# Patient Record
Sex: FEMALE | Race: BLACK OR AFRICAN AMERICAN | HISPANIC OR LATINO | Employment: FULL TIME | URBAN - METROPOLITAN AREA
[De-identification: names, ages, dates, MRNs, and addresses within clinical notes are randomized per-mention and may not be internally consistent; named-entity substitution may affect disease eponyms.]

---

## 2017-04-21 ENCOUNTER — OFFICE VISIT (OUTPATIENT)
Dept: OBSTETRICS AND GYNECOLOGY | Facility: CLINIC | Age: 27
End: 2017-04-21
Payer: COMMERCIAL

## 2017-04-21 VITALS
WEIGHT: 211 LBS | SYSTOLIC BLOOD PRESSURE: 125 MMHG | HEIGHT: 67 IN | BODY MASS INDEX: 33.12 KG/M2 | DIASTOLIC BLOOD PRESSURE: 62 MMHG

## 2017-04-21 DIAGNOSIS — Z01.419 ROUTINE GYNECOLOGICAL EXAMINATION: Primary | ICD-10-CM

## 2017-04-21 PROCEDURE — 99385 PREV VISIT NEW AGE 18-39: CPT | Mod: S$GLB,,, | Performed by: OBSTETRICS & GYNECOLOGY

## 2017-04-21 PROCEDURE — 99999 PR PBB SHADOW E&M-NEW PATIENT-LVL III: CPT | Mod: PBBFAC,,, | Performed by: OBSTETRICS & GYNECOLOGY

## 2017-04-21 PROCEDURE — 88175 CYTOPATH C/V AUTO FLUID REDO: CPT
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
## 2017-08-06 ENCOUNTER — HOSPITAL ENCOUNTER (EMERGENCY)
Facility: HOSPITAL | Age: 27
Discharge: HOME | End: 2017-08-06
Attending: EMERGENCY MEDICINE
Payer: COMMERCIAL

## 2017-08-06 VITALS
TEMPERATURE: 98 F | HEIGHT: 67 IN | OXYGEN SATURATION: 98 % | HEART RATE: 64 BPM | SYSTOLIC BLOOD PRESSURE: 116 MMHG | WEIGHT: 185 LBS | RESPIRATION RATE: 18 BRPM | BODY MASS INDEX: 29.03 KG/M2 | DIASTOLIC BLOOD PRESSURE: 66 MMHG

## 2017-08-06 DIAGNOSIS — M25.531 RIGHT WRIST PAIN: ICD-10-CM
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
## 2017-08-06 LAB
B-HCG UR QL: NEGATIVE
CTP QC/QA: YES

## 2017-08-06 PROCEDURE — 81025 URINE PREGNANCY TEST: CPT | Performed by: PHYSICIAN ASSISTANT

## 2017-08-06 PROCEDURE — 25000003 PHARM REV CODE 250: Performed by: PHYSICIAN ASSISTANT

## 2017-08-06 PROCEDURE — 63600175 PHARM REV CODE 636 W HCPCS: Performed by: PHYSICIAN ASSISTANT

## 2017-08-06 PROCEDURE — 96372 THER/PROPH/DIAG INJ SC/IM: CPT

## 2017-08-06 PROCEDURE — 99284 EMERGENCY DEPT VISIT MOD MDM: CPT | Mod: 25

## 2017-08-06 RX ORDER — SILVER SULFADIAZINE 10 G/1000G
CREAM TOPICAL
Status: COMPLETED | OUTPATIENT
Start: 2017-08-06 | End: 2017-08-06

## 2017-08-06 RX ORDER — IBUPROFEN 600 MG/1
600 TABLET ORAL EVERY 6 HOURS PRN
Qty: 30 TABLET | Refills: 0 | Status: SHIPPED | OUTPATIENT
Start: 2017-08-06 | End: 2021-05-29

## 2017-08-06 RX ORDER — KETOROLAC TROMETHAMINE 30 MG/ML
10 INJECTION, SOLUTION INTRAMUSCULAR; INTRAVENOUS
Status: COMPLETED | OUTPATIENT
Start: 2017-08-06 | End: 2017-08-06

## 2017-08-06 RX ORDER — METHOCARBAMOL 500 MG/1
1000 TABLET, FILM COATED ORAL 3 TIMES DAILY PRN
Qty: 30 TABLET | Refills: 0 | Status: SHIPPED | OUTPATIENT
Start: 2017-08-06 | End: 2017-08-11

## 2017-08-06 RX ADMIN — KETOROLAC TROMETHAMINE 10 MG: 30 INJECTION, SOLUTION INTRAMUSCULAR at 10:08

## 2017-08-06 RX ADMIN — SILVER SULFADIAZINE: 10 CREAM TOPICAL at 10:08
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
## 2018-04-29 ENCOUNTER — OFFICE VISIT (OUTPATIENT)
Dept: URGENT CARE | Facility: CLINIC | Age: 28
End: 2018-04-29
Payer: COMMERCIAL

## 2018-04-29 VITALS
OXYGEN SATURATION: 98 % | HEART RATE: 110 BPM | WEIGHT: 222 LBS | TEMPERATURE: 99 F | SYSTOLIC BLOOD PRESSURE: 114 MMHG | BODY MASS INDEX: 34.84 KG/M2 | HEIGHT: 67 IN | RESPIRATION RATE: 16 BRPM | DIASTOLIC BLOOD PRESSURE: 78 MMHG

## 2018-04-29 DIAGNOSIS — Z87.898 HISTORY OF EPISTAXIS: ICD-10-CM

## 2018-04-29 DIAGNOSIS — R11.2 NAUSEA AND VOMITING, INTRACTABILITY OF VOMITING NOT SPECIFIED, UNSPECIFIED VOMITING TYPE: Primary | ICD-10-CM

## 2018-04-29 LAB
GLUCOSE SERPL-MCNC: 87 MG/DL (ref 70–110)
POC ANION GAP: 18 MMOL/L
POC BUN: 11 MMOL/L
POC CHLORIDE: 101 MMOL/L
POC CREATININE: 0.8 MG/DL (ref 0.6–1.3)
POC HEMATOCRIT: 44 %PCV (ref 37–47)
POC HEMOGLOBIN: 15 G/DL (ref 12.5–16)
POC ICA: 1.24 MMOL/L
POC POTASSIUM: 4 MMOL/L
POC SODIUM: 142 MMOL/L
POC TCO2: 29 MMOL/L

## 2018-04-29 PROCEDURE — 80047 BASIC METABLC PNL IONIZED CA: CPT | Mod: QW,S$GLB,, | Performed by: NURSE PRACTITIONER

## 2018-04-29 PROCEDURE — S0119 ONDANSETRON 4 MG: HCPCS | Mod: S$GLB,,, | Performed by: FAMILY MEDICINE

## 2018-04-29 PROCEDURE — 99214 OFFICE O/P EST MOD 30 MIN: CPT | Mod: S$GLB,,, | Performed by: NURSE PRACTITIONER

## 2018-04-29 RX ORDER — ONDANSETRON 8 MG/1
8 TABLET, ORALLY DISINTEGRATING ORAL
Status: COMPLETED | OUTPATIENT
Start: 2018-04-29 | End: 2018-04-29

## 2018-04-29 RX ORDER — ONDANSETRON 8 MG/1
8 TABLET, ORALLY DISINTEGRATING ORAL EVERY 8 HOURS PRN
Qty: 15 TABLET | Refills: 0 | Status: SHIPPED | OUTPATIENT
Start: 2018-04-29 | End: 2018-05-04

## 2018-04-29 RX ADMIN — ONDANSETRON 8 MG: 8 TABLET, ORALLY DISINTEGRATING ORAL at 03:04

## 2018-04-29 NOTE — PROGRESS NOTES
"Subjective:       Patient ID: Lilliana Sterling is a 27 y.o. female.    Vitals:  height is 5' 7" (1.702 m) and weight is 100.7 kg (222 lb). Her temperature is 99.2 °F (37.3 °C). Her blood pressure is 114/78 and her pulse is 110. Her respiration is 16 and oxygen saturation is 98%.     Chief Complaint: Emesis    Pt in because she had been throwing up blood. Started last night. having a sore throat as well. Pt said when she starts coughing she throws up. Been having a fever. Been having a bloody nose as well. Tried taking mukund seltzer last ngiht, but has not really helped.       Emesis    This is a new problem. The current episode started yesterday. The problem has been gradually worsening. Associated symptoms include coughing, dizziness, a fever, headaches and sweats. Pertinent negatives include no abdominal pain, chest pain, chills or diarrhea.     Review of Systems   Constitution: Positive for fever. Negative for chills.   Eyes: Negative for discharge.   Cardiovascular: Negative for chest pain.   Respiratory: Positive for cough. Negative for shortness of breath.    Skin: Negative for flushing and rash.   Musculoskeletal: Negative for back pain.   Gastrointestinal: Positive for vomiting. Negative for abdominal pain, constipation, diarrhea, hematochezia, melena and nausea.   Genitourinary: Negative for dysuria, genital sores, hematuria and urgency.   Neurological: Positive for dizziness, headaches and light-headedness.       Objective:      Physical Exam   Constitutional: She is oriented to person, place, and time. She appears well-developed and well-nourished. She appears ill.   HENT:   Head: Normocephalic and atraumatic.   Right Ear: External ear normal.   Left Ear: External ear normal.   Nose: Nose normal.   Mouth/Throat: Mucous membranes are normal. Posterior oropharyngeal erythema (Post nasal drainage noted) present. Tonsils are 1+ on the right. Tonsils are 1+ on the left. No tonsillar exudate.   Eyes: " Conjunctivae, EOM and lids are normal. Pupils are equal, round, and reactive to light.   Neck: Trachea normal, normal range of motion and full passive range of motion without pain. Neck supple.   Cardiovascular: Normal rate, regular rhythm and normal heart sounds.    Pulmonary/Chest: Effort normal and breath sounds normal. No respiratory distress.   Abdominal: Soft. Normal appearance and bowel sounds are normal. She exhibits no distension, no abdominal bruit, no pulsatile midline mass and no mass. There is no hepatosplenomegaly. There is no tenderness.   Musculoskeletal: Normal range of motion. She exhibits no edema.   Lymphadenopathy:     She has no cervical adenopathy.   Neurological: She is alert and oriented to person, place, and time. She has normal strength.   Skin: Skin is warm, dry and intact. She is not diaphoretic. No pallor.   Psychiatric: She has a normal mood and affect. Her speech is normal and behavior is normal. Judgment and thought content normal. Cognition and memory are normal.   Nursing note and vitals reviewed.      Assessment:       1. Nausea and vomiting, intractability of vomiting not specified, unspecified vomiting type    2. History of epistaxis        Plan:         Nausea and vomiting, intractability of vomiting not specified, unspecified vomiting type  -     POCT Chemistry Panel (Manual)  -     ondansetron disintegrating tablet 8 mg; Take 1 tablet (8 mg total) by mouth one time.  -     ondansetron (ZOFRAN-ODT) 8 MG TbDL; Take 1 tablet (8 mg total) by mouth every 8 (eight) hours as needed.  Dispense: 15 tablet; Refill: 0  -     Ambulatory referral to Internal Medicine    History of epistaxis      Patient Instructions   Nausea & Vomiting  If your condition worsens or fails to improve we recommend that you receive another evaluation at the ER immediately or contact your PCP to discuss your concerns or return here. You must understand that you've received an urgent care treatment only and  that you may be released before all your medical problems are known or treated. You the patient will arrange for followup care as instructed.   Use prescribed anti-nausea medicine as needed and prescribed   Increase fluids and rest is important   Start off with liquid diet and progress as tolerated (see below)  · Water and clear liquids are important so you do not get dehydrated. Drink a small amount at a time.  · Do not force yourself to eat, especially if you have cramps, vomiting, or diarrhea. When you finally decide to start eating, do not eat large amounts at a time, even if you are hungry.  · If you eat, avoid fatty, greasy, spicy, or fried foods.  · Do not eat dairy products if you have diarrhea; they can make the diarrhea worse  Watch for any increase pain, fever, localized pain to right lower abdomen or continued vomiting or diarrhea.    Nose Bleeds:  - OTC Saline Nasal Spray Daily as directed; Keep nose lubricated with Vaseline or petroleum jelly as needed  -  Avoid any environmental triggers   -  Humidifier use at home  -  Afrin Nasal Spray twice daily as directed for the next 3 days only.  -  If a nose bleed reoccurs,  Apply pressure to nasal bridge for 10 minutes without interruption.    -  For any worsening of symptoms or a nose bleed that increases beyond 20 minutes of applied pressure, please return here or go to the ER  -  Review patient handout for additional information  Diet for Vomiting or Diarrhea (Adult)    Your symptoms may return or get worse after eating certain foods listed below. If this happens, stop eating these foods until your symptoms ease and you feel better.  Once the vomiting stops, follow the steps below.   During the first 12 to 24 hours  During the first 12 to 24 hours, follow this diet:  · Drinks. Plain water, sport drinks like electrolyte solutions, soft drinks without caffeine, mineral water (plain or flavored), clear fruit juices, and decaffeinated tea and  "coffee.  · Soups. Clear broth.  · Desserts. Plain gelatin, popsicles, and fruit juice bars. As you feel better, you may add 6 to 8 ounces of yogurt per day. If you have diarrhea, don't have foods or drinks that contain sugar, high-fructose corn syrup, or sugar alcohols.  During the next 24 hours  During the next 24 hours you may add the following to the above:  · Hot cereal, plain toast, bread, rolls, and crackers  · Plain noodles, rice, mashed potatoes, and chicken noodle or rice soup  · Unsweetened canned fruit (but not pineapple) and bananas  Don't eat more than 15 grams of fat a day. Do this by staying away from margarine, butter, oils, mayonnaise, sauces, gravies, fried foods, peanut butter, meat, poultry, and fish.  Don't eat much fiber. Stay away from raw or cooked vegetables, fresh fruits (except bananas), and bran cereals.  Limit how much caffeine and chocolate you have. Do not use any spices or seasonings except salt.  During the next 24 hours  Slowly go back to your normal diet, as you feel better and your symptoms ease.  Date Last Reviewed: 8/1/2016  © 4368-3388 [employer]. [address] [place] [address]. All rights reserved. This information is not intended as a substitute for professional medical care. Always follow your healthcare professional's instructions.        Vomiting (Adult)  Vomiting is a common symptom that may be due to different causes. These include gastroenteritis ("stomach flu"), food poisoning and gastritis. There are other more serious causes of vomiting which may be hard to diagnose early in the illness. Therefore, it is important to watch for the warning signs listed below.  The main danger from repeated vomiting is dehydration. This is due to excess loss of water and minerals from the body. When this occurs, body fluids must be replaced.  Home care  · If symptoms are severe, rest at home for the next 24 hours.  · Because your symptoms may be from an " infection, wash your hands frequently and well, and use alcohol-based  to avoid spreading the infection to others.  · Wash your hands for at least 20 seconds. Hum the happy birthday song twice for the correct length of time.  · Wash your hands after using the toilet, before and after preparing food, before eating food, after changing a diaper, cleaning a wound, caring for a sick person, and blowing your nose, coughing, or sneezing. You should also wash your hands after caring for someone who is sick, touching pet food, or treats, and touching an animal, or animal waste.  · You may use acetaminophen or NSAID medicines like ibuprofen or naproxen to control fever, unless another medicine was prescribed. If you have chronic liver or kidney disease or ever had a stomach ulcer or GI bleeding, talk with your doctor before using these medicines. Aspirin should never be used in anyone under 18 years of age who is ill with a fever. It may cause severe liver damage. Don't use NSAID medicines if you are already taking one for another condition (like arthritis) or are on aspirin (such as for heart disease, or after a stroke)  · Avoid tobacco and alcohol use, which may worsen your symptoms.  · If medicines for vomiting were prescribed, take as directed.  · Once vomiting stops, then follow these guidelines:  During the first 12 to 24 hours follow the diet below:  · Fruit juices. Apple, grape juice, clear fruit drinks, and electrolyte replacement drinks.  · Beverages. Soft drinks without caffeine; mineral water (plain or flavored), decaffeinated tea and coffee.  · Soups. Clear broth, consommé and bouillon  · Desserts. Plain gelatin, popsicles and fruit juice bars. As you feel better, you may add 6-8 ounces of yogurt per day.  During the next 24 hours you may add the following to the above:  · Hot cereal, plain toast, bread, rolls, crackers  · Plain noodles, rice, mashed potatoes, chicken noodle or rice soup  · Unsweetened  canned fruit (avoid pineapple), bananas  · Limit caffeine and chocolate. No spices or seasonings except salt.  During the next 24 hours:  Gradually resume a normal diet, as you feel better and your symptoms lessen.  Follow-up care  Follow up with your healthcare provider, or as advised.  When to seek medical advice  Call your healthcare provider right away if any of these occur:  · Constant right-sided lower abdominal pain or increasing general abdominal pain  · Continued vomiting (unable to keep liquids down) for 24 hours  · Frequent diarrhea (more than 5 times a day); blood (red or black color) or mucus in diarrhea  · Reduced urine output or extreme thirst  · Weakness, dizziness or fainting  · Unusually drowsy or confused  · Fever of 100.4°F (38°C) oral or higher, or as directed  · Yellow color of the eyes or skin  Date Last Reviewed: 11/16/2015  © 0440-9904 [employer]. [address] [place] [address]. All rights reserved. This information is not intended as a substitute for professional medical care. Always follow your healthcare professional's instructions.        Nosebleed (Adult)    Bleeding from the nose most commonly occurs because of injury or drying and cracking of the inner lining of the nose. Most nosebleeds are because of dry air or nose-picking. They can occur during a common cold or an allergy attack. They can also occur on a very hot day, or from dry air in the winter.  If the bleeding site is found, it may be cauterized. This means it is treated to cause a blood clot to form. This may be done with a chemical, heat, or electricity. If the bleeding continues after the site is cauterized, or if the site cannot be found, packing may be put in your nose. This is to apply pressure and stop the bleeding. The packing may be made of gauze or sponge. A small balloon catheter is sometimes used. These must be removed by your doctor. Some types of packing dissolve on their own.  Home  care  · If packing was put in your nose, unless told otherwise, do not pull on it or try to remove it yourself. You will be given an appointment to have it removed. You may also have been given antibiotics to prevent a sinus infection. If so, finish all of the medicine.  · Do not blow your nose for 12 hours after the bleeding stops. This will allow a strong blood clot to form. Do not pick your nose. This may restart bleeding.  · Avoid drinking alcohol and hot liquids for the next 2 days. Alcohol or hot liquids in your mouth can dilate blood vessels in your nose. This can cause bleeding to start again.  · Do not take ibuprofen, naproxen, or medicines that contain aspirin. These thin the blood and may cause your nose to bleed. You may take acetaminophen for pain, unless another pain medicine was prescribed.  · If the bleeding starts again, sit up and lean forward to prevent swallowing blood. Pinch your nose tightly on both sides, as shown above, for 10 to 15 minutes. Time yourself. Dont release the pressure on your nose until 10 minutes is up. If bleeding does not stop, continue to pinch your nose and call your healthcare provider or return to this facility.  · If you have a cold, allergies, or dry nasal membranes, lubricate the nasal passages. Apply a small amount of petroleum jelly inside the nose with a cotton swab twice a day (morning and night).  · Avoid overheating your home. This can dry the air and make your condition worse.  · Put a humidifier in the room where you sleep. This will add moisture to the air.  · Use a saline nasal spray to keep nasal passages moist.  · Do not pick your nose. Keep fingernails trimmed to decrease risk of bleeds.  · Do not smoke.  Follow-up care  Follow up with your healthcare provider, or as advised. Nasal packing should be rechecked or removed within 2 to 3 days.  When to seek medical advice  Call your healthcare provider right away if any of these occur.  · You have another  nosebleed that you cannot control  · Dizziness, weakness, or fainting  · You become tired or confused  · Fever of 100.4ºF (38ºC) or higher, or as directed by your healthcare provider  · Headache  · Sinus or facial pain  · Shortness of breath or trouble breathing  Date Last Reviewed: 3/22/2015  © 7864-7643 [employer]. [address] [place] [address]. All rights reserved. This information is not intended as a substitute for professional medical care. Always follow your healthcare professional's instructions.

## 2018-04-29 NOTE — PROGRESS NOTES
Patient unsure about how much blood she threw up on yesterday and this morning but described it as phlegm.  Reports she had a nose bleed on yesterday and may have swallowed some blood during the process.  Patient is nauseas during the exam while experiences some coughing periods that are productive thick white phlegm.      Patient reports she feels she does not get a full breath at times but attributes this to her asthma.  States she has not had to use her inhaler or breathing treatment in a while.  Denies any fever or chest pain, however she does report chest irritation with coughing.  Discussed this case with Dr. Choi. A chemistry panel and Orthostatic BP reading was ordered and both normal.  .         Spoke with patient and provided education around nose bleeds, how to manage and things to avoid when having a nose bleed (i.e. head titled back). Also advised patient that she would need an additional workup to rule out any additional issues.  An urgent care referral was created in the system and the patient was given the number to follow up in the morning to confirm her appointment.  Discharge instructions and handouts reviewed. Patient verbalized understanding and agrees with plan of care.

## 2018-04-29 NOTE — PATIENT INSTRUCTIONS
Nausea & Vomiting  If your condition worsens or fails to improve we recommend that you receive another evaluation at the ER immediately or contact your PCP to discuss your concerns or return here. You must understand that you've received an urgent care treatment only and that you may be released before all your medical problems are known or treated. You the patient will arrange for followup care as instructed.   Use prescribed anti-nausea medicine as needed and prescribed   Increase fluids and rest is important   Start off with liquid diet and progress as tolerated (see below)  · Water and clear liquids are important so you do not get dehydrated. Drink a small amount at a time.  · Do not force yourself to eat, especially if you have cramps, vomiting, or diarrhea. When you finally decide to start eating, do not eat large amounts at a time, even if you are hungry.  · If you eat, avoid fatty, greasy, spicy, or fried foods.  · Do not eat dairy products if you have diarrhea; they can make the diarrhea worse  Watch for any increase pain, fever, localized pain to right lower abdomen or continued vomiting or diarrhea.    Nose Bleeds:  - OTC Saline Nasal Spray Daily as directed; Keep nose lubricated with Vaseline or petroleum jelly as needed  -  Avoid any environmental triggers   -  Humidifier use at home  -  Afrin Nasal Spray twice daily as directed for the next 3 days only.  -  If a nose bleed reoccurs,  Apply pressure to nasal bridge for 10 minutes without interruption.    -  For any worsening of symptoms or a nose bleed that increases beyond 20 minutes of applied pressure, please return here or go to the ER  -  Review patient handout for additional information      General Referral to [facility]  You were referred to [facility] to Establish Care.  Please call [phone]  to schedule your appointment.    Please return here or go to the Emergency Department for any concerns or worsening of  condition.  Please follow up with your primary care doctor or specialist in the next 48-72hrs as needed.    If you  smoke, please stop smoking.  Diet for Vomiting or Diarrhea (Adult)    Your symptoms may return or get worse after eating certain foods listed below. If this happens, stop eating these foods until your symptoms ease and you feel better.  Once the vomiting stops, follow the steps below.   During the first 12 to 24 hours  During the first 12 to 24 hours, follow this diet:  · Drinks. Plain water, sport drinks like electrolyte solutions, soft drinks without caffeine, mineral water (plain or flavored), clear fruit juices, and decaffeinated tea and coffee.  · Soups. Clear broth.  · Desserts. Plain gelatin, popsicles, and fruit juice bars. As you feel better, you may add 6 to 8 ounces of yogurt per day. If you have diarrhea, don't have foods or drinks that contain sugar, high-fructose corn syrup, or sugar alcohols.  During the next 24 hours  During the next 24 hours you may add the following to the above:  · Hot cereal, plain toast, bread, rolls, and crackers  · Plain noodles, rice, mashed potatoes, and chicken noodle or rice soup  · Unsweetened canned fruit (but not pineapple) and bananas  Don't eat more than 15 grams of fat a day. Do this by staying away from margarine, butter, oils, mayonnaise, sauces, gravies, fried foods, peanut butter, meat, poultry, and fish.  Don't eat much fiber. Stay away from raw or cooked vegetables, fresh fruits (except bananas), and bran cereals.  Limit how much caffeine and chocolate you have. Do not use any spices or seasonings except salt.  During the next 24 hours  Slowly go back to your normal diet, as you feel better and your symptoms ease.  Date Last Reviewed: 8/1/2016  © 6758-0225 [employer]. [address] [place] [address], [place] [address]. All rights reserved. This information is not intended as a substitute for professional medical care. Always follow your  "healthcare professional's instructions.        Vomiting (Adult)  Vomiting is a common symptom that may be due to different causes. These include gastroenteritis ("stomach flu"), food poisoning and gastritis. There are other more serious causes of vomiting which may be hard to diagnose early in the illness. Therefore, it is important to watch for the warning signs listed below.  The main danger from repeated vomiting is dehydration. This is due to excess loss of water and minerals from the body. When this occurs, body fluids must be replaced.  Home care  · If symptoms are severe, rest at home for the next 24 hours.  · Because your symptoms may be from an infection, wash your hands frequently and well, and use alcohol-based  to avoid spreading the infection to others.  · Wash your hands for at least 20 seconds. Hum the happy birthday song twice for the correct length of time.  · Wash your hands after using the toilet, before and after preparing food, before eating food, after changing a diaper, cleaning a wound, caring for a sick person, and blowing your nose, coughing, or sneezing. You should also wash your hands after caring for someone who is sick, touching pet food, or treats, and touching an animal, or animal waste.  · You may use acetaminophen or NSAID medicines like ibuprofen or naproxen to control fever, unless another medicine was prescribed. If you have chronic liver or kidney disease or ever had a stomach ulcer or GI bleeding, talk with your doctor before using these medicines. Aspirin should never be used in anyone under 18 years of age who is ill with a fever. It may cause severe liver damage. Don't use NSAID medicines if you are already taking one for another condition (like arthritis) or are on aspirin (such as for heart disease, or after a stroke)  · Avoid tobacco and alcohol use, which may worsen your symptoms.  · If medicines for vomiting were prescribed, take as directed.  · Once vomiting " stops, then follow these guidelines:  During the first 12 to 24 hours follow the diet below:  · Fruit juices. Apple, grape juice, clear fruit drinks, and electrolyte replacement drinks.  · Beverages. Soft drinks without caffeine; mineral water (plain or flavored), decaffeinated tea and coffee.  · Soups. Clear broth, consommé and bouillon  · Desserts. Plain gelatin, popsicles and fruit juice bars. As you feel better, you may add 6-8 ounces of yogurt per day.  During the next 24 hours you may add the following to the above:  · Hot cereal, plain toast, bread, rolls, crackers  · Plain noodles, rice, mashed potatoes, chicken noodle or rice soup  · Unsweetened canned fruit (avoid pineapple), bananas  · Limit caffeine and chocolate. No spices or seasonings except salt.  During the next 24 hours:  Gradually resume a normal diet, as you feel better and your symptoms lessen.  Follow-up care  Follow up with your healthcare provider, or as advised.  When to seek medical advice  Call your healthcare provider right away if any of these occur:  · Constant right-sided lower abdominal pain or increasing general abdominal pain  · Continued vomiting (unable to keep liquids down) for 24 hours  · Frequent diarrhea (more than 5 times a day); blood (red or black color) or mucus in diarrhea  · Reduced urine output or extreme thirst  · Weakness, dizziness or fainting  · Unusually drowsy or confused  · Fever of 100.4°F (38°C) oral or higher, or as directed  · Yellow color of the eyes or skin  Date Last Reviewed: 11/16/2015 © 2000-2017 [employer]. [address] [place] [address]. All rights reserved. This information is not intended as a substitute for professional medical care. Always follow your healthcare professional's instructions.        Nosebleed (Adult)    Bleeding from the nose most commonly occurs because of injury or drying and cracking of the inner lining of the nose. Most nosebleeds are because of dry  air or nose-picking. They can occur during a common cold or an allergy attack. They can also occur on a very hot day, or from dry air in the winter.  If the bleeding site is found, it may be cauterized. This means it is treated to cause a blood clot to form. This may be done with a chemical, heat, or electricity. If the bleeding continues after the site is cauterized, or if the site cannot be found, packing may be put in your nose. This is to apply pressure and stop the bleeding. The packing may be made of gauze or sponge. A small balloon catheter is sometimes used. These must be removed by your doctor. Some types of packing dissolve on their own.  Home care  · If packing was put in your nose, unless told otherwise, do not pull on it or try to remove it yourself. You will be given an appointment to have it removed. You may also have been given antibiotics to prevent a sinus infection. If so, finish all of the medicine.  · Do not blow your nose for 12 hours after the bleeding stops. This will allow a strong blood clot to form. Do not pick your nose. This may restart bleeding.  · Avoid drinking alcohol and hot liquids for the next 2 days. Alcohol or hot liquids in your mouth can dilate blood vessels in your nose. This can cause bleeding to start again.  · Do not take ibuprofen, naproxen, or medicines that contain aspirin. These thin the blood and may cause your nose to bleed. You may take acetaminophen for pain, unless another pain medicine was prescribed.  · If the bleeding starts again, sit up and lean forward to prevent swallowing blood. Pinch your nose tightly on both sides, as shown above, for 10 to 15 minutes. Time yourself. Dont release the pressure on your nose until 10 minutes is up. If bleeding does not stop, continue to pinch your nose and call your healthcare provider or return to this facility.  · If you have a cold, allergies, or dry nasal membranes, lubricate the nasal passages. Apply a small amount of  petroleum jelly inside the nose with a cotton swab twice a day (morning and night).  · Avoid overheating your home. This can dry the air and make your condition worse.  · Put a humidifier in the room where you sleep. This will add moisture to the air.  · Use a saline nasal spray to keep nasal passages moist.  · Do not pick your nose. Keep fingernails trimmed to decrease risk of bleeds.  · Do not smoke.  Follow-up care  Follow up with your healthcare provider, or as advised. Nasal packing should be rechecked or removed within 2 to 3 days.  When to seek medical advice  Call your healthcare provider right away if any of these occur.  · You have another nosebleed that you cannot control  · Dizziness, weakness, or fainting  · You become tired or confused  · Fever of 100.4ºF (38ºC) or higher, or as directed by your healthcare provider  · Headache  · Sinus or facial pain  · Shortness of breath or trouble breathing  Date Last Reviewed: 3/22/2015  © 2703-0154 [employer]. [address] [place] [address], [place] [address]. All rights reserved. This information is not intended as a substitute for professional medical care. Always follow your healthcare professional's instructions.

## 2018-05-01 ENCOUNTER — TELEPHONE (OUTPATIENT)
Dept: INTERNAL MEDICINE | Facility: CLINIC | Age: 28
End: 2018-05-01

## 2018-06-08 ENCOUNTER — OFFICE VISIT (OUTPATIENT)
Dept: OBSTETRICS AND GYNECOLOGY | Facility: CLINIC | Age: 28
End: 2018-06-08
Payer: COMMERCIAL

## 2018-06-08 VITALS
HEIGHT: 67 IN | SYSTOLIC BLOOD PRESSURE: 120 MMHG | DIASTOLIC BLOOD PRESSURE: 74 MMHG | WEIGHT: 219.13 LBS | BODY MASS INDEX: 34.39 KG/M2

## 2018-06-08 DIAGNOSIS — Z01.419 WELL WOMAN EXAM WITH ROUTINE GYNECOLOGICAL EXAM: Primary | ICD-10-CM

## 2018-06-08 DIAGNOSIS — Z32.01 PREGNANCY TEST POSITIVE: ICD-10-CM

## 2018-06-08 DIAGNOSIS — N92.6 IRREGULAR MENSES: ICD-10-CM

## 2018-06-08 LAB
B-HCG UR QL: NEGATIVE
CTP QC/QA: YES

## 2018-06-08 PROCEDURE — 81025 URINE PREGNANCY TEST: CPT | Mod: S$GLB,,, | Performed by: OBSTETRICS & GYNECOLOGY

## 2018-06-08 PROCEDURE — 99999 PR PBB SHADOW E&M-EST. PATIENT-LVL III: CPT | Mod: PBBFAC,,, | Performed by: OBSTETRICS & GYNECOLOGY

## 2018-06-08 PROCEDURE — 99395 PREV VISIT EST AGE 18-39: CPT | Mod: S$GLB,,, | Performed by: OBSTETRICS & GYNECOLOGY

## 2018-06-08 RX ORDER — NORGESTIMATE AND ETHINYL ESTRADIOL 0.25-0.035
1 KIT ORAL DAILY
Qty: 90 TABLET | Refills: 3 | Status: SHIPPED | OUTPATIENT
Start: 2018-06-08 | End: 2018-10-28

## 2018-06-08 NOTE — PROGRESS NOTES
"[facility]  Ambulatory Clinic  Obstetrics & Gynecology    Visit Date:  2018    Chief Complaint:  Annual GYN exam, irregular periods    History of Present Illness:      Lilliana Sterling is a 27 y.o. , new pt to me, here for a gynecologic exam with c/o irregular menses past year.    Menses are q45-60, lasting 4-5 days, not heavy or painful.    Pt current method of family planning is rhythm method, and reports no problems with this method.      Pt denies family h/o adverse reaction to hormonal contraception.    Pt denies h/o abnormal pap, last pap ~2017.    Pt denies active sexually transmitted infections.    Pt performs monthly self breast examination, non-smoker, uses seat belts, and denies abuse.     Pt denies vaginal discharge, dysmenorrhea, dyspareunia, pelvic pain, bloating, early satiety, unintentional weight loss, breast mass/skin changes, incontinence, GI or urinary complaints.      Otherwise, the pt is in her usual state of health and has good follow-up with her PCP.    Past History:  Gynecologic history as noted above.    Review of Systems:      GENERAL:  No fever, fatigue, excessive weight gain or loss  HEENT:  No headaches, hearing changes, visual disturbance  RESPIRATORY:  No cough, shortness of breath  CARDIOVASCULAR:  No chest pain, heart palpitations, leg swelling  BREAST:  No lump, pain, nipple discharge, skin changes  GASTROINTESTINAL:  No nausea, vomiting, constipation, diarrhea, abd pain, rectal bleeding   GENITOURINARY:  See HPI  ENDOCRINE:  No heat or cold intolerance  HEMATOLOGIC:  No easy bruisability or bleeding   LYMPHATICS:  No enlarged nodes  MUSCULOSKELETAL:  No joint pain or swelling  SKIN:  No rash, lesions, jaundice  NEUROLOGIC:  No dizziness, weakness, syncope  PSYCHIATRIC:  No depression, homicidal/suicidal ideations, anxiety or mood swings    Physical Exam:     /74   Ht 5' 7" (1.702 m)   Wt 99.4 kg (219 lb 2.2 oz)   LMP 2018   BMI " 34.32 kg/m²   Pulse 60, Resp rate 18  Patient's last menstrual period was 2018.     GENERAL:  No acute distress, well-nourished  HEENT:  Atraumatic, anicteric, moist mucus membranes. Neck supple w/o masses.  BREAST:  Symmetric, nontender, no obvious masses, adenopathy, skin changes or nipple discharge.  LUNGS:  Clear to auscultation  HEART:  Regular rate and rhythm, no murmurs, gallops, or rubs  ABDOMEN:  Soft, non-tender, non-distended, normoactive bowel sounds, no obvious organomegaly  EXT:  Symmetric w/o cramping, claudication, or edema. +2 distal pulses.  SKIN:  No rashes or bruising  PSYCH:  Mood and affect appropriate    GENITOURINARY:    VULVAR:  Female external genitalia w/o obvious lesions. Female hair distribution. Normal urethral meatus. No gross lymphadenopathy.   VAGINA:  Pink, moist, well-rugated. Good support. No obvious lesion. No discharge.  CERVIX:  No cervical motion tenderness, discharge, or obvious lesions.   UTERUS:  Small, non-tender, normal contour  ADNEXA:  No masses, non-tender    RECTAL:  Declined. No obvious external lesions  WET PREP:  Negative     Chaperone present for exam.    Assessment:     27 y.o. :    1. Well woman gynecologic exam  2. Irregular menses    Plan:    A gynecologic health assessment was performed with age appropriate counseling.    Cervical cancer screening - pap up to date.    STI screening - pt declined.  Safe sex discussed.      D/w pt her irregular menses likely secondary to anovulation.  UPT negative today.  TSH normal.  Pt is interested in medical mgt with OCP.  Risks, benefits, and alternatives to OCP reviewed.  Pt was instructed on proper use of OCP.  Risk of smoking with OCP discussed.  Bleeding/pregnancy precautions.     Encourage healthy lifestyle modifications, monthly self breast exams, Ca/Vit D.    F/u with PCP for health maintenance.    F/u 3 months, or sooner as needed, pt states she will call to schedule.  All questions answered, pt voiced  understanding.        Alfredo North MD

## 2018-10-28 ENCOUNTER — OFFICE VISIT (OUTPATIENT)
Dept: URGENT CARE | Facility: CLINIC | Age: 28
End: 2018-10-28
Payer: COMMERCIAL

## 2018-10-28 VITALS
HEIGHT: 67 IN | OXYGEN SATURATION: 100 % | RESPIRATION RATE: 12 BRPM | SYSTOLIC BLOOD PRESSURE: 104 MMHG | DIASTOLIC BLOOD PRESSURE: 65 MMHG | HEART RATE: 71 BPM | TEMPERATURE: 98 F | BODY MASS INDEX: 34.32 KG/M2

## 2018-10-28 DIAGNOSIS — R30.0 DYSURIA: ICD-10-CM

## 2018-10-28 DIAGNOSIS — R31.9 URINARY TRACT INFECTION WITH HEMATURIA, SITE UNSPECIFIED: Primary | ICD-10-CM

## 2018-10-28 DIAGNOSIS — N39.0 URINARY TRACT INFECTION WITH HEMATURIA, SITE UNSPECIFIED: Primary | ICD-10-CM

## 2018-10-28 LAB
B-HCG UR QL: NEGATIVE
BILIRUB UR QL STRIP: NEGATIVE
CTP QC/QA: YES
GLUCOSE UR QL STRIP: NEGATIVE
KETONES UR QL STRIP: NEGATIVE
LEUKOCYTE ESTERASE UR QL STRIP: POSITIVE
PH, POC UA: 5.5 (ref 5–8)
POC BLOOD, URINE: POSITIVE
POC NITRATES, URINE: NEGATIVE
PROT UR QL STRIP: NEGATIVE
SP GR UR STRIP: 1.02 (ref 1–1.03)
UROBILINOGEN UR STRIP-ACNC: POSITIVE (ref 0.1–1.1)

## 2018-10-28 PROCEDURE — 87086 URINE CULTURE/COLONY COUNT: CPT

## 2018-10-28 PROCEDURE — 99214 OFFICE O/P EST MOD 30 MIN: CPT | Mod: 25,S$GLB,, | Performed by: NURSE PRACTITIONER

## 2018-10-28 PROCEDURE — 99000 SPECIMEN HANDLING OFFICE-LAB: CPT | Mod: S$GLB,,, | Performed by: FAMILY MEDICINE

## 2018-10-28 PROCEDURE — 3008F BODY MASS INDEX DOCD: CPT | Mod: CPTII,S$GLB,, | Performed by: NURSE PRACTITIONER

## 2018-10-28 PROCEDURE — 81025 URINE PREGNANCY TEST: CPT | Mod: S$GLB,,, | Performed by: NURSE PRACTITIONER

## 2018-10-28 PROCEDURE — 81003 URINALYSIS AUTO W/O SCOPE: CPT | Mod: QW,S$GLB,, | Performed by: NURSE PRACTITIONER

## 2018-10-28 RX ORDER — PHENAZOPYRIDINE HYDROCHLORIDE 100 MG/1
100 TABLET, FILM COATED ORAL 3 TIMES DAILY PRN
Qty: 20 TABLET | Refills: 0 | Status: SHIPPED | OUTPATIENT
Start: 2018-10-28 | End: 2019-10-28

## 2018-10-28 RX ORDER — NITROFURANTOIN 25; 75 MG/1; MG/1
100 CAPSULE ORAL 2 TIMES DAILY
Qty: 14 CAPSULE | Refills: 0 | Status: SHIPPED | OUTPATIENT
Start: 2018-10-28 | End: 2018-11-04

## 2018-10-28 NOTE — PATIENT INSTRUCTIONS
"                                                                    UTI   If your condition worsens or fails to improve we recommend that you receive another evaluation at the ER immediately or contact your PCP to discuss your concerns or return here. You must understand that you've received an urgent care treatment only and that you may be released before all your medical problems are known or treated. You the patient will arrange for followup care as instructed.   If you were prescribed antibiotics, please take them to full completion.    If you had cultures done it will take 3-5 days to result. We will call you with the result.   If you are are female and on BCP use additional methods to prevent pregnancy while on the antibiotics and for one cycle after.   Cranberry juice may help. Get the 100% cranberry juice and mix 4 oz of juice with 4 oz of water and drink this 8 oz glass of liquid once a day.     Bladder Infection, Female (Adult)    Urine is normally doesn't have any bacteria in it. But bacteria can get into the urinary tract from the skin around the rectum. Or they can travel in the blood from elsewhere in the body. Once they are in your urinary tract, they can cause infection in the urethra (urethritis), the bladder (cystitis), or the kidneys (pyelonephritis).  The most common place for an infection is in the bladder. This is called a bladder infection. This is one of the most common infections in women. Most bladder infections are easily treated. They are not serious unless the infection spreads to the kidney.  The phrases "bladder infection," "UTI," and "cystitis" are often used to describe the same thing. But they are not always the same. Cystitis is an inflammation of the bladder. The most common cause of cystitis is an infection.  Symptoms  The infection causes inflammation in the urethra and bladder. This causes many of the symptoms. The most common symptoms of a bladder infection are:  · Pain or " burning when urinating  · Having to urinate more often than usual  · Urgent need to urinate  · Only a small amount of urine comes out  · Blood in urine  · Abdominal discomfort. This is usually in the lower abdomen above the pubic bone.  · Cloudy urine  · Strong- or bad-smelling urine  · Unable to urinate (urinary retention)  · Unable to hold urine in (urinary incontinence)  · Fever  · Loss of appetite  · Confusion (in older adults)  Causes  Bladder infections are not contagious. You can't get one from someone else, from a toilet seat, or from sharing a bath.  The most common cause of bladder infections is bacteria from the bowels. The bacteria get onto the skin around the opening of the urethra. From there, they can get into the urine and travel up to the bladder, causing inflammation and infection. This usually happens because of:  · Wiping improperly after urinating. Always wipe from front to back.  · Bowel incontinence  · Pregnancy  · Procedures such as having a catheter inserted  · Older age  · Not emptying your bladder. This can allow bacteria a chance to grow in your urine.  · Dehydration  · Constipation  · Sex  · Use of a diaphragm for birth control   Treatment  Bladder infections are diagnosed by a urine test. They are treated with antibiotics and usually clear up quickly without complications. Treatment helps prevent a more serious kidney infection.  Medicines  Medicines can help in the treatment of a bladder infection:  · Take antibiotics until they are used up, even if you feel better. It is important to finish them to make sure the infection has cleared.  · You can use acetaminophen or ibuprofen for pain, fever, or discomfort, unless another medicine was prescribed. If you have chronic liver or kidney disease, talk with your healthcare provider before using these medicines. Also talk with your provider if you've ever had a stomach ulcer or gastrointestinal bleeding, or are taking blood-thinner  medicines.  · If you are given phenazopydridine to reduce burning with urination, it will cause your urine to become a bright orange color. This can stain clothing.  Care and prevention  These self-care steps can help prevent future infections:  · Drink plenty of fluids to prevent dehydration and flush out your bladder. Do this unless you must restrict fluids for other health reasons, or your doctor told you not to.  · Proper cleaning after going to the bathroom is important. Wipe from front to back after using the toilet to prevent the spread of bacteria.  · Urinate more often. Don't try to hold urine in for a long time.  · Wear loose-fitting clothes and cotton underwear. Avoid tight-fitting pants.  · Improve your diet and prevent constipation. Eat more fresh fruit and vegetables, and fiber, and less junk and fatty foods.  · Avoid sex until your symptoms are gone.  · Avoid caffeine, alcohol, and spicy foods. These can irritate your bladder.  · Urinate right after intercourse to flush out your bladder.  · If you use birth control pills and have frequent bladder infections, discuss it with your doctor.  Follow-up care  Call your healthcare provider if all symptoms are not gone after 3 days of treatment. This is especially important if you have repeat infections.  If a culture was done, you will be told if your treatment needs to be changed. If directed, you can call to find out the results.  If X-rays were done, you will be told if the results will affect your treatment.  Call 911  Call 911 if any of the following occur:  · Trouble breathing  · Hard to wake up or confusion  · Fainting or loss of consciousness  · Rapid heart rate  When to seek medical advice  Call your healthcare provider right away if any of these occur:  · Fever of 100.4ºF (38.0ºC) or higher, or as directed by your healthcare provider  · Symptoms are not better by the third day of treatment  · Back or belly (abdominal) pain that gets  worse  · Repeated vomiting, or unable to keep medicine down  · Weakness or dizziness  · Vaginal discharge  · Pain, redness, or swelling in the outer vaginal area (labia)  Date Last Reviewed: 10/1/2016  © 2809-6553 [employer]. [address] [place] [address], [place] [address]. All rights reserved. This information is not intended as a substitute for professional medical care. Always follow your healthcare professional's instructions.

## 2018-10-28 NOTE — PROGRESS NOTES
"Subjective:       Patient ID: Lilliana Sterling is a 27 y.o. female.    Vitals:  height is 5' 7" (1.702 m). Her tympanic temperature is 98.1 °F (36.7 °C). Her blood pressure is 104/65 and her pulse is 71. Her respiration is 12 and oxygen saturation is 100%.     Chief Complaint: Urinary Tract Infection    Patient states that she has Vaginal pain with burning sensation and Yellowish discharge that started yesterday.  She was riding her bike yesterday and is uncertain if that was the cause for her vaginal pain.  She has been increasing fluids, but states that it does not help her symptoms.        Urinary Tract Infection    This is a new problem. The current episode started yesterday. The problem occurs every urination. The problem has been gradually worsening. The quality of the pain is described as burning and aching. The pain is at a severity of 7/10. The pain is moderate. There has been no fever. She is sexually active. There is no history of pyelonephritis. Associated symptoms include a discharge. Pertinent negatives include no behavior changes, chills, flank pain, frequency, hematuria, hesitancy, nausea, possible pregnancy, sweats, urgency, vomiting, weight loss, bubble bath use, constipation, rash or withholding. She has tried increased fluids for the symptoms. The treatment provided no relief. There is no history of catheterization, diabetes insipidus, diabetes mellitus, genitourinary reflux, hypertension, kidney stones, recurrent UTIs, a single kidney, STD, urinary stasis or a urological procedure.     Review of Systems   Constitution: Negative for chills, fever and weight loss.   Skin: Negative for itching and rash.   Musculoskeletal: Negative for back pain.   Gastrointestinal: Negative for abdominal pain, constipation, nausea and vomiting.   Genitourinary: Positive for dysuria. Negative for flank pain, frequency, genital sores, hematuria, hesitancy, missed menses, non-menstrual bleeding and urgency.   All " other systems reviewed and are negative.      Objective:      Physical Exam   Constitutional: She is oriented to person, place, and time. Vital signs are normal. She appears well-developed and well-nourished.  Non-toxic appearance. She does not have a sickly appearance. She does not appear ill. No distress.   HENT:   Head: Normocephalic and atraumatic.   Right Ear: Hearing, tympanic membrane, external ear and ear canal normal.   Left Ear: Hearing, tympanic membrane, external ear and ear canal normal.   Nose: Nose normal. No mucosal edema, rhinorrhea or nasal deformity. No epistaxis.   Mouth/Throat: Oropharynx is clear and moist and mucous membranes are normal.   Eyes: Conjunctivae, EOM and lids are normal. Pupils are equal, round, and reactive to light.   Neck: Trachea normal, normal range of motion and phonation normal. Neck supple.   Cardiovascular: Normal rate, regular rhythm, S1 normal, S2 normal, normal heart sounds and normal pulses.   Pulmonary/Chest: Effort normal and breath sounds normal. She has no decreased breath sounds. She has no wheezes. She has no rhonchi. She has no rales.   Abdominal: Soft. Normal appearance and bowel sounds are normal. She exhibits no distension and no mass. There is no hepatosplenomegaly. There is tenderness in the suprapubic area. There is no rigidity, no rebound, no guarding, no CVA tenderness, no tenderness at McBurney's point and negative Everett's sign.   Lymphadenopathy:     She has no cervical adenopathy.   Neurological: She is alert and oriented to person, place, and time.   Skin: Skin is warm, dry and intact. No rash noted.   Psychiatric: She has a normal mood and affect. Her speech is normal and behavior is normal. Cognition and memory are normal.   Nursing note and vitals reviewed.      Results for orders placed or performed in visit on 10/28/18   POCT Urinalysis, Dipstick, Automated, W/O Scope   Result Value Ref Range    POC Blood, Urine Positive (A) Negative    POC  Bilirubin, Urine Negative Negative    POC Urobilinogen, Urine positive (A) 0.1 - 1.1    POC Ketones, Urine Negative Negative    POC Protein, Urine Negative Negative    POC Nitrates, Urine Negative Negative    POC Glucose, Urine Negative Negative    pH, UA 5.5 5 - 8    POC Specific Gravity, Urine 1.025 1.003 - 1.029    POC Leukocytes, Urine Positive (A) Negative   POCT urine pregnancy   Result Value Ref Range    POC Preg Test, Ur Negative Negative     Acceptable Yes       Assessment:       1. Urinary tract infection with hematuria, site unspecified    2. Dysuria        Plan:         Urinary tract infection with hematuria, site unspecified  -     Urine culture  -     nitrofurantoin, macrocrystal-monohydrate, (MACROBID) 100 MG capsule; Take 1 capsule (100 mg total) by mouth 2 (two) times daily. for 7 days  Dispense: 14 capsule; Refill: 0    Dysuria  -     POCT Urinalysis, Dipstick, Automated, W/O Scope  -     POCT urine pregnancy  -     phenazopyridine (PYRIDIUM) 100 MG tablet; Take 1 tablet (100 mg total) by mouth 3 (three) times daily as needed for Pain.  Dispense: 20 tablet; Refill: 0      Patient Instructions                                                                       UTI   If your condition worsens or fails to improve we recommend that you receive another evaluation at the ER immediately or contact your PCP to discuss your concerns or return here. You must understand that you've received an urgent care treatment only and that you may be released before all your medical problems are known or treated. You the patient will arrange for followup care as instructed.   If you were prescribed antibiotics, please take them to full completion.    If you had cultures done it will take 3-5 days to result. We will call you with the result.   If you are are female and on BCP use additional methods to prevent pregnancy while on the antibiotics and for one cycle after.   Cranberry juice may help. Get the  "100% cranberry juice and mix 4 oz of juice with 4 oz of water and drink this 8 oz glass of liquid once a day.     Bladder Infection, Female (Adult)    Urine is normally doesn't have any bacteria in it. But bacteria can get into the urinary tract from the skin around the rectum. Or they can travel in the blood from elsewhere in the body. Once they are in your urinary tract, they can cause infection in the urethra (urethritis), the bladder (cystitis), or the kidneys (pyelonephritis).  The most common place for an infection is in the bladder. This is called a bladder infection. This is one of the most common infections in women. Most bladder infections are easily treated. They are not serious unless the infection spreads to the kidney.  The phrases "bladder infection," "UTI," and "cystitis" are often used to describe the same thing. But they are not always the same. Cystitis is an inflammation of the bladder. The most common cause of cystitis is an infection.  Symptoms  The infection causes inflammation in the urethra and bladder. This causes many of the symptoms. The most common symptoms of a bladder infection are:  · Pain or burning when urinating  · Having to urinate more often than usual  · Urgent need to urinate  · Only a small amount of urine comes out  · Blood in urine  · Abdominal discomfort. This is usually in the lower abdomen above the pubic bone.  · Cloudy urine  · Strong- or bad-smelling urine  · Unable to urinate (urinary retention)  · Unable to hold urine in (urinary incontinence)  · Fever  · Loss of appetite  · Confusion (in older adults)  Causes  Bladder infections are not contagious. You can't get one from someone else, from a toilet seat, or from sharing a bath.  The most common cause of bladder infections is bacteria from the bowels. The bacteria get onto the skin around the opening of the urethra. From there, they can get into the urine and travel up to the bladder, causing inflammation and " infection. This usually happens because of:  · Wiping improperly after urinating. Always wipe from front to back.  · Bowel incontinence  · Pregnancy  · Procedures such as having a catheter inserted  · Older age  · Not emptying your bladder. This can allow bacteria a chance to grow in your urine.  · Dehydration  · Constipation  · Sex  · Use of a diaphragm for birth control   Treatment  Bladder infections are diagnosed by a urine test. They are treated with antibiotics and usually clear up quickly without complications. Treatment helps prevent a more serious kidney infection.  Medicines  Medicines can help in the treatment of a bladder infection:  · Take antibiotics until they are used up, even if you feel better. It is important to finish them to make sure the infection has cleared.  · You can use acetaminophen or ibuprofen for pain, fever, or discomfort, unless another medicine was prescribed. If you have chronic liver or kidney disease, talk with your healthcare provider before using these medicines. Also talk with your provider if you've ever had a stomach ulcer or gastrointestinal bleeding, or are taking blood-thinner medicines.  · If you are given phenazopydridine to reduce burning with urination, it will cause your urine to become a bright orange color. This can stain clothing.  Care and prevention  These self-care steps can help prevent future infections:  · Drink plenty of fluids to prevent dehydration and flush out your bladder. Do this unless you must restrict fluids for other health reasons, or your doctor told you not to.  · Proper cleaning after going to the bathroom is important. Wipe from front to back after using the toilet to prevent the spread of bacteria.  · Urinate more often. Don't try to hold urine in for a long time.  · Wear loose-fitting clothes and cotton underwear. Avoid tight-fitting pants.  · Improve your diet and prevent constipation. Eat more fresh fruit and vegetables, and fiber, and  less junk and fatty foods.  · Avoid sex until your symptoms are gone.  · Avoid caffeine, alcohol, and spicy foods. These can irritate your bladder.  · Urinate right after intercourse to flush out your bladder.  · If you use birth control pills and have frequent bladder infections, discuss it with your doctor.  Follow-up care  Call your healthcare provider if all symptoms are not gone after 3 days of treatment. This is especially important if you have repeat infections.  If a culture was done, you will be told if your treatment needs to be changed. If directed, you can call to find out the results.  If X-rays were done, you will be told if the results will affect your treatment.  Call 911  Call 911 if any of the following occur:  · Trouble breathing  · Hard to wake up or confusion  · Fainting or loss of consciousness  · Rapid heart rate  When to seek medical advice  Call your healthcare provider right away if any of these occur:  · Fever of 100.4ºF (38.0ºC) or higher, or as directed by your healthcare provider  · Symptoms are not better by the third day of treatment  · Back or belly (abdominal) pain that gets worse  · Repeated vomiting, or unable to keep medicine down  · Weakness or dizziness  · Vaginal discharge  · Pain, redness, or swelling in the outer vaginal area (labia)  Date Last Reviewed: 10/1/2016  © 4202-3356 [employer]. [address] [place] [address], [place] [address]. All rights reserved. This information is not intended as a substitute for professional medical care. Always follow your healthcare professional's instructions.

## 2018-10-28 NOTE — LETTER
October 28, 2018      [facility]  [facility] [address]  Phone: [phone]  Fax: [phone]       Patient: Lilliana Sterling   YOB: 1990  Date of Visit: 10/28/2018    To Whom It May Concern:    Emilie Sterling  was at [facility] on 10/28/2018. She may return to work/school on 10/29/18 with no restrictions. If you have any questions or concerns, or if I can be of further assistance, please do not hesitate to contact me.    Sincerely,    Francis Reyes NP

## 2018-10-30 ENCOUNTER — TELEPHONE (OUTPATIENT)
Dept: URGENT CARE | Facility: CLINIC | Age: 28
End: 2018-10-30

## 2018-10-30 LAB — BACTERIA UR CULT: NORMAL

## 2018-10-30 NOTE — TELEPHONE ENCOUNTER
----- Message from Francis Reyes NP sent at 10/30/2018  9:24 AM CDT -----  Please call patient back with normal culture results.  Please also ask how they are feeling.  If the symptoms are improving, have her complete her full course of antibiotics.  If the symptoms return after treatment, have her follow-up with either her PCP or GYN.  If she needs a referral, we can assist with this.   Thanks

## 2019-02-20 ENCOUNTER — OFFICE VISIT (OUTPATIENT)
Dept: OBSTETRICS AND GYNECOLOGY | Facility: CLINIC | Age: 29
End: 2019-02-20
Payer: COMMERCIAL

## 2019-02-20 VITALS
WEIGHT: 244 LBS | HEIGHT: 67 IN | HEART RATE: 70 BPM | BODY MASS INDEX: 38.3 KG/M2 | SYSTOLIC BLOOD PRESSURE: 122 MMHG | DIASTOLIC BLOOD PRESSURE: 76 MMHG | RESPIRATION RATE: 18 BRPM

## 2019-02-20 DIAGNOSIS — N89.8 VAGINAL DISCHARGE: Primary | ICD-10-CM

## 2019-02-20 PROCEDURE — 99213 PR OFFICE/OUTPT VISIT, EST, LEVL III, 20-29 MIN: ICD-10-PCS | Mod: S$GLB,,, | Performed by: OBSTETRICS & GYNECOLOGY

## 2019-02-20 PROCEDURE — 3008F PR BODY MASS INDEX (BMI) DOCUMENTED: ICD-10-PCS | Mod: CPTII,S$GLB,, | Performed by: OBSTETRICS & GYNECOLOGY

## 2019-02-20 PROCEDURE — 99999 PR PBB SHADOW E&M-EST. PATIENT-LVL III: CPT | Mod: PBBFAC,,, | Performed by: OBSTETRICS & GYNECOLOGY

## 2019-02-20 PROCEDURE — 99213 OFFICE O/P EST LOW 20 MIN: CPT | Mod: S$GLB,,, | Performed by: OBSTETRICS & GYNECOLOGY

## 2019-02-20 PROCEDURE — 99999 PR PBB SHADOW E&M-EST. PATIENT-LVL III: ICD-10-PCS | Mod: PBBFAC,,, | Performed by: OBSTETRICS & GYNECOLOGY

## 2019-02-20 PROCEDURE — 3008F BODY MASS INDEX DOCD: CPT | Mod: CPTII,S$GLB,, | Performed by: OBSTETRICS & GYNECOLOGY

## 2019-02-20 RX ORDER — FLUCONAZOLE 150 MG/1
150 TABLET ORAL
Qty: 6 TABLET | Refills: 0 | Status: SHIPPED | OUTPATIENT
Start: 2019-02-20 | End: 2021-05-31

## 2019-02-20 RX ORDER — METRONIDAZOLE 500 MG/1
500 TABLET ORAL EVERY 12 HOURS
Qty: 28 TABLET | Refills: 0 | Status: SHIPPED | OUTPATIENT
Start: 2019-02-20 | End: 2019-02-27

## 2019-02-21 NOTE — PROGRESS NOTES
"[facility]  Ambulatory Clinic  Obstetrics & Gynecology    Visit Date:  2019    Chief Complaint:  vaginal discharge    History of Present Illness:      Lilliana Sterling is a 28 y.o.  here with c/o vaginal discharge.  Pt described discharge as itchy, white, non bloody, without pelvic pain or abnormal vaginal bleeding for past few days.  Pt menses has been more regular/monthly, not heavy or painful since her last visit while on OCP.  Pt denies abnormal vaginal bleeding, vaginal discharge, dysmenorrhea, dyspareunia, pelvic pain, bloating, early satiety, unintentional weight loss, breast mass/skin changes, incontinence, GI or urinary complaints.      Past History:  Gynecologic history as noted above.    Review of Systems:      GENERAL:  No fever, fatigue, excessive weight gain or loss  HEENT:  No headaches, hearing changes, visual disturbance  RESPIRATORY:  No cough, shortness of breath  CARDIOVASCULAR:  No chest pain, heart palpitations, leg swelling  BREAST:  No lump, pain, nipple discharge, skin changes  GASTROINTESTINAL:  No nausea, vomiting, constipation, diarrhea, abd pain, rectal bleeding   GENITOURINARY:  See HPI    Physical Exam:     /76   Pulse 70   Resp 18   Ht 5' 7" (1.702 m)   Wt 110.7 kg (244 lb)   LMP 2019   BMI 38.22 kg/m²      GENERAL:  No acute distress, well-nourished  LUNGS:  Clear to auscultation  HEART:  Regular rate and rhythm, no murmurs, gallops, or rubs  ABDOMEN:  Soft, non-tender, non-distended, normoactive bowel sounds, no obvious organomegaly  GENITOURINARY:  NFEG no lesion. No vaginal or cervical lesion. No bleeding. Mild white discharge, +yeast. Good support. No CMT. Uterus and ovaries small, NT. Declined rectal exam. No obvious external lesions.    Chaperone present for exam.    Assessment:     28 y.o. :    1. Vaginal yeast infection    Plan:    Diflucan 150 mg po x 1 for yeast infection.  If unresolved, use monistat 7.  Pt also " requesting an Rx for flagyl for future use for bacterial vaginosis.  No alcohol while on flagyl.  Hygiene advice.  Encourage healthy lifestyle modifications, monthly self breast exams, f/u with PCP for health maintenance.  F/u 7/2019 for annual GYN exam.    Voiced understanding.        Alfredo North MD

## 2019-04-16 NOTE — LETTER
April 29, 2018      [facility] [address]  Phone: [phone]  Fax: [phone]       Patient: Lilliana Sterling   YOB: 1990  Date of Visit: 04/29/2018    To Whom It May Concern:    Emilie Sterling  was at [facility] on 04/29/2018. She may return to work/school on 5/1/18 with no restrictions. If you have any questions or concerns, or if I can be of further assistance, please do not hesitate to contact me.    Sincerely,    Francis Reyes NP     
None

## 2020-11-18 ENCOUNTER — OFFICE VISIT (OUTPATIENT)
Dept: GASTROENTEROLOGY | Facility: CLINIC | Age: 30
End: 2020-11-18
Payer: COMMERCIAL

## 2020-11-18 VITALS
HEART RATE: 54 BPM | HEIGHT: 67 IN | SYSTOLIC BLOOD PRESSURE: 114 MMHG | DIASTOLIC BLOOD PRESSURE: 73 MMHG | BODY MASS INDEX: 34.53 KG/M2 | WEIGHT: 220 LBS

## 2020-11-18 DIAGNOSIS — K59.04 CHRONIC IDIOPATHIC CONSTIPATION: ICD-10-CM

## 2020-11-18 DIAGNOSIS — R10.10 PAIN OF UPPER ABDOMEN: ICD-10-CM

## 2020-11-18 DIAGNOSIS — K21.9 GASTROESOPHAGEAL REFLUX DISEASE, UNSPECIFIED WHETHER ESOPHAGITIS PRESENT: ICD-10-CM

## 2020-11-18 PROCEDURE — 99204 OFFICE O/P NEW MOD 45 MIN: CPT | Mod: S$GLB,,, | Performed by: INTERNAL MEDICINE

## 2020-11-18 PROCEDURE — 99999 PR PBB SHADOW E&M-EST. PATIENT-LVL III: ICD-10-PCS | Mod: PBBFAC,,, | Performed by: INTERNAL MEDICINE

## 2020-11-18 PROCEDURE — 3008F PR BODY MASS INDEX (BMI) DOCUMENTED: ICD-10-PCS | Mod: CPTII,S$GLB,, | Performed by: INTERNAL MEDICINE

## 2020-11-18 PROCEDURE — 3008F BODY MASS INDEX DOCD: CPT | Mod: CPTII,S$GLB,, | Performed by: INTERNAL MEDICINE

## 2020-11-18 PROCEDURE — 1125F PR PAIN SEVERITY QUANTIFIED, PAIN PRESENT: ICD-10-PCS | Mod: S$GLB,,, | Performed by: INTERNAL MEDICINE

## 2020-11-18 PROCEDURE — 1125F AMNT PAIN NOTED PAIN PRSNT: CPT | Mod: S$GLB,,, | Performed by: INTERNAL MEDICINE

## 2020-11-18 PROCEDURE — 99204 PR OFFICE/OUTPT VISIT, NEW, LEVL IV, 45-59 MIN: ICD-10-PCS | Mod: S$GLB,,, | Performed by: INTERNAL MEDICINE

## 2020-11-18 PROCEDURE — 99999 PR PBB SHADOW E&M-EST. PATIENT-LVL III: CPT | Mod: PBBFAC,,, | Performed by: INTERNAL MEDICINE

## 2020-11-18 NOTE — H&P (VIEW-ONLY)
[facility]    CC: abdominal pain    HPI 30 y.o. female who presents for further evaluation of chronic, daily, moderate abdominal pain with associated nausea and constipation.  Her abdominal pain is worsened by eating.  She feels full after eating one meal and has an overall decreased appetite.  She denies weight loss and vomiting.  She has been taking frequent NSAIDS for her abdominal pain but her pain began prior to her NSAID use.    She recently saw her PCP and was started on Omeprazole, Carafate, Bentyl and Linzess.  She reports she went from having one BM per week to one BM daily with Linzess 72 mcg daily.    The patient is new to me.    Chart reviewed and summarized here.    Past Medical History  Past Medical History:   Diagnosis Date    Asthma        Past Surgical History  Past Surgical History:   Procedure Laterality Date     SECTION         Social History  Social History     Tobacco Use    Smoking status: Never Smoker    Smokeless tobacco: Never Used   Substance Use Topics    Alcohol use: No    Drug use: No       Family History  Family History   Problem Relation Age of Onset    Diabetes Paternal Grandmother    No pertinent family history of colon cancer or gastric cancer      Review of Systems  General ROS: negative for chills, fever or weight loss.  Positive for weight gain  Psychological ROS: negative for hallucination, depression or suicidal ideation  Ophthalmic ROS: negative for blurry vision, photophobia or eye pain  ENT ROS: negative for epistaxis, sore throat or rhinorrhea  Respiratory ROS: no cough, shortness of breath, or wheezing  Cardiovascular ROS: no chest pain or dyspnea on exertion  Gastrointestinal ROS: positive for abdominal pain, nausea, and constipation  Genito-Urinary ROS: no dysuria, trouble voiding, or hematuria  Musculoskeletal ROS: negative for gait disturbance or muscular weakness  Neurological ROS: no syncope or seizures; no ataxia  Dermatological  "ROS: negative for pruritis, rash and jaundice    Physical Examination  /73   Pulse (!) 54   Ht 5' 7" (1.702 m)   Wt 99.8 kg (220 lb)   BMI 34.46 kg/m²   General appearance: alert, cooperative, no distress  HENT: Normocephalic, atraumatic, neck symmetrical, no nasal discharge   Eyes: conjunctivae/corneas clear, PERRL, EOM's intact  Lungs: clear to auscultation bilaterally, no dullness to percussion bilaterally  Heart: regular rate and rhythm without rub; no displacement of the PMI   Abdomen: soft, non-tender; bowel sounds normoactive; no organomegaly  Extremities: extremities symmetric; no clubbing, cyanosis, or edema  Integument: Skin color, texture, turgor normal; no rashes; hair distrubution normal  Neurologic: Alert and oriented X 3, normal strength, normal coordination and gait  Psychiatric: no pressured speech; normal affect; no evidence of impaired cognition     Labs:  Lab Results   Component Value Date    WBC 7.4 11/11/2020    HGB 12.9 11/11/2020    HCT 39.5 11/11/2020    MCV 87 11/11/2020     11/11/2020         CMP  Sodium   Date Value Ref Range Status   11/11/2020 138 134 - 144 mmol/L Final     Potassium   Date Value Ref Range Status   11/11/2020 4.0 3.5 - 5.2 mmol/L Final     Chloride   Date Value Ref Range Status   11/11/2020 104 96 - 106 mmol/L Final     CO2   Date Value Ref Range Status   11/11/2020 23 20 - 29 mmol/L Final     Glucose   Date Value Ref Range Status   11/11/2020 87 65 - 99 mg/dL Final     BUN   Date Value Ref Range Status   11/11/2020 11 6 - 20 mg/dL Final     Creatinine   Date Value Ref Range Status   11/11/2020 0.78 0.57 - 1.00 mg/dL Final     Calcium   Date Value Ref Range Status   11/11/2020 9.5 8.7 - 10.2 mg/dL Final     Total Protein   Date Value Ref Range Status   05/30/2019 7.2 6.0 - 8.5 g/dL Final     Albumin   Date Value Ref Range Status   11/11/2020 4.6 3.8 - 4.8 g/dL Final   05/30/2019 4.2 3.5 - 5.5 g/dL Final     Total Bilirubin   Date Value Ref Range Status "   11/11/2020 0.4 0.0 - 1.2 mg/dL Final     Alkaline Phosphatase   Date Value Ref Range Status   05/30/2019 64 39 - 117 IU/L Final     AST   Date Value Ref Range Status   11/11/2020 9 0 - 40 IU/L Final     ALT   Date Value Ref Range Status   11/11/2020 10 0 - 32 IU/L Final     eGFR if non    Date Value Ref Range Status   11/11/2020 102 >59 mL/min/1.73 Final       Imaging: No imaging to review    Assessment:   1.  Abdominal pain  2.  Nausea  3.  Early satiety/fullness  4.  Constipation-improved with Linzess 72mcg daily    Plan:   Schedule EGD for further evaluation for a cause for her symptoms    If no improvement by endoscopy then I will increase her PPI to BID    If her EGD is unrevealing then I will plan for a gastric emptying study as the next step in her work up.    Continue Omeprazole, Carafate, Bentyl PRN and Linzess.    Faviola Leslie MD

## 2020-11-18 NOTE — LETTER
November 18, 2020      Jose Fitzgerald MD  [address] [facility]  [place] [address]           [facility]  [address]   [place] [address]  Phone: [phone]  Fax: [phone]          Patient: Lilliana Sterling   MR Number: [id number]   YOB: 1990   Date of Visit: 11/18/2020       Dear Dr. Jose Fitzgerald:    Thank you for referring Lilliana Sterling to me for evaluation. Attached you will find relevant portions of my assessment and plan of care.    If you have questions, please do not hesitate to call me. I look forward to following Lilliana Sterling along with you.    Sincerely,    Faviola Leslie MD    Enclosure  CC:  No Recipients    If you would like to receive this communication electronically, please contact [email or web address] or [phone] to request more information on [employer] Link access.    For providers and/or their staff who would like to refer a patient to [facility], please contact us through our one-stop-shop provider referral line, [facility], at [phone].    If you feel you have received this communication in error or would no longer like to receive these types of communications, please e-mail [email or web address]

## 2020-11-21 ENCOUNTER — CLINICAL SUPPORT (OUTPATIENT)
Dept: URGENT CARE | Facility: CLINIC | Age: 30
End: 2020-11-21
Payer: COMMERCIAL

## 2020-11-21 DIAGNOSIS — Z12.11 COLON CANCER SCREENING: ICD-10-CM

## 2020-11-21 PROCEDURE — U0003 INFECTIOUS AGENT DETECTION BY NUCLEIC ACID (DNA OR RNA); SEVERE ACUTE RESPIRATORY SYNDROME CORONAVIRUS 2 (SARS-COV-2) (CORONAVIRUS DISEASE [COVID-19]), AMPLIFIED PROBE TECHNIQUE, MAKING USE OF HIGH THROUGHPUT TECHNOLOGIES AS DESCRIBED BY CMS-2020-01-R: HCPCS

## 2020-11-21 PROCEDURE — 99211 OFF/OP EST MAY X REQ PHY/QHP: CPT | Mod: S$GLB,,, | Performed by: NURSE PRACTITIONER

## 2020-11-21 PROCEDURE — 99211 PR OFFICE/OUTPT VISIT, EST, LEVL I: ICD-10-PCS | Mod: S$GLB,,, | Performed by: NURSE PRACTITIONER

## 2020-11-22 LAB — SARS-COV-2 RNA RESP QL NAA+PROBE: NOT DETECTED

## 2020-11-24 ENCOUNTER — HOSPITAL ENCOUNTER (EMERGENCY)
Facility: HOSPITAL | Age: 30
Discharge: HOME | End: 2020-11-24
Attending: EMERGENCY MEDICINE
Payer: COMMERCIAL

## 2020-11-24 ENCOUNTER — HOSPITAL ENCOUNTER (OUTPATIENT)
Facility: HOSPITAL | Age: 30
Discharge: STILL ADMITTED OR ADMINISTRATIVE | End: 2020-11-24
Attending: INTERNAL MEDICINE | Admitting: INTERNAL MEDICINE
Payer: COMMERCIAL

## 2020-11-24 ENCOUNTER — ANESTHESIA (OUTPATIENT)
Dept: ENDOSCOPY | Facility: HOSPITAL | Age: 30
End: 2020-11-24
Payer: COMMERCIAL

## 2020-11-24 ENCOUNTER — ANESTHESIA EVENT (OUTPATIENT)
Dept: ENDOSCOPY | Facility: HOSPITAL | Age: 30
End: 2020-11-24
Payer: COMMERCIAL

## 2020-11-24 ENCOUNTER — TELEPHONE (OUTPATIENT)
Dept: GASTROENTEROLOGY | Facility: CLINIC | Age: 30
End: 2020-11-24

## 2020-11-24 VITALS
TEMPERATURE: 99 F | HEIGHT: 67 IN | OXYGEN SATURATION: 100 % | DIASTOLIC BLOOD PRESSURE: 70 MMHG | HEART RATE: 59 BPM | WEIGHT: 220 LBS | BODY MASS INDEX: 34.53 KG/M2 | SYSTOLIC BLOOD PRESSURE: 118 MMHG | RESPIRATION RATE: 21 BRPM

## 2020-11-24 VITALS
TEMPERATURE: 98 F | RESPIRATION RATE: 18 BRPM | HEIGHT: 67 IN | BODY MASS INDEX: 35.16 KG/M2 | WEIGHT: 224 LBS | DIASTOLIC BLOOD PRESSURE: 82 MMHG | SYSTOLIC BLOOD PRESSURE: 140 MMHG | OXYGEN SATURATION: 100 % | HEART RATE: 64 BPM

## 2020-11-24 DIAGNOSIS — K21.9 GERD (GASTROESOPHAGEAL REFLUX DISEASE): ICD-10-CM

## 2020-11-24 DIAGNOSIS — R29.90 STROKE-LIKE SYMPTOM: ICD-10-CM

## 2020-11-24 DIAGNOSIS — R29.898 RIGHT ARM WEAKNESS: Primary | ICD-10-CM

## 2020-11-24 LAB
ALBUMIN SERPL BCP-MCNC: 4.2 G/DL (ref 3.5–5.2)
ALP SERPL-CCNC: 70 U/L (ref 55–135)
ALT SERPL W/O P-5'-P-CCNC: 30 U/L (ref 10–44)
ANION GAP SERPL CALC-SCNC: 9 MMOL/L (ref 8–16)
AST SERPL-CCNC: 20 U/L (ref 10–40)
B-HCG UR QL: NEGATIVE
BASOPHILS # BLD AUTO: 0.02 K/UL (ref 0–0.2)
BASOPHILS NFR BLD: 0.4 % (ref 0–1.9)
BILIRUB SERPL-MCNC: 0.3 MG/DL (ref 0.1–1)
BUN SERPL-MCNC: 9 MG/DL (ref 6–20)
CALCIUM SERPL-MCNC: 9.2 MG/DL (ref 8.7–10.5)
CHLORIDE SERPL-SCNC: 106 MMOL/L (ref 95–110)
CHOLEST SERPL-MCNC: 120 MG/DL (ref 120–199)
CHOLEST/HDLC SERPL: 3.6 {RATIO} (ref 2–5)
CO2 SERPL-SCNC: 26 MMOL/L (ref 23–29)
CREAT SERPL-MCNC: 0.8 MG/DL (ref 0.5–1.4)
CTP QC/QA: YES
DIFFERENTIAL METHOD: NORMAL
EOSINOPHIL # BLD AUTO: 0 K/UL (ref 0–0.5)
EOSINOPHIL NFR BLD: 0.6 % (ref 0–8)
ERYTHROCYTE [DISTWIDTH] IN BLOOD BY AUTOMATED COUNT: 14 % (ref 11.5–14.5)
EST. GFR  (AFRICAN AMERICAN): >60 ML/MIN/1.73 M^2
EST. GFR  (NON AFRICAN AMERICAN): >60 ML/MIN/1.73 M^2
GLUCOSE SERPL-MCNC: 78 MG/DL (ref 70–110)
GLUCOSE SERPL-MCNC: 86 MG/DL (ref 70–110)
HCT VFR BLD AUTO: 40.8 % (ref 37–48.5)
HDLC SERPL-MCNC: 33 MG/DL (ref 40–75)
HDLC SERPL: 27.5 % (ref 20–50)
HGB BLD-MCNC: 13.2 G/DL (ref 12–16)
IMM GRANULOCYTES # BLD AUTO: 0.01 K/UL (ref 0–0.04)
IMM GRANULOCYTES NFR BLD AUTO: 0.2 % (ref 0–0.5)
INR PPP: 1 (ref 0.8–1.2)
LDLC SERPL CALC-MCNC: 76.4 MG/DL (ref 63–159)
LYMPHOCYTES # BLD AUTO: 1.9 K/UL (ref 1–4.8)
LYMPHOCYTES NFR BLD: 35.9 % (ref 18–48)
MAGNESIUM SERPL-MCNC: 1.7 MG/DL (ref 1.6–2.6)
MCH RBC QN AUTO: 28 PG (ref 27–31)
MCHC RBC AUTO-ENTMCNC: 32.4 G/DL (ref 32–36)
MCV RBC AUTO: 86 FL (ref 82–98)
MONOCYTES # BLD AUTO: 0.6 K/UL (ref 0.3–1)
MONOCYTES NFR BLD: 10.4 % (ref 4–15)
NEUTROPHILS # BLD AUTO: 2.8 K/UL (ref 1.8–7.7)
NEUTROPHILS NFR BLD: 52.5 % (ref 38–73)
NONHDLC SERPL-MCNC: 87 MG/DL
NRBC BLD-RTO: 0 /100 WBC
PLATELET # BLD AUTO: 261 K/UL (ref 150–350)
PMV BLD AUTO: 11.4 FL (ref 9.2–12.9)
POCT GLUCOSE: 83 MG/DL (ref 70–110)
POTASSIUM SERPL-SCNC: 4.2 MMOL/L (ref 3.5–5.1)
PROT SERPL-MCNC: 7.8 G/DL (ref 6–8.4)
PROTHROMBIN TIME: 11 SEC (ref 9–12.5)
RBC # BLD AUTO: 4.72 M/UL (ref 4–5.4)
SARS-COV-2 RDRP RESP QL NAA+PROBE: NEGATIVE
SODIUM SERPL-SCNC: 141 MMOL/L (ref 136–145)
TRIGL SERPL-MCNC: 53 MG/DL (ref 30–150)
TSH SERPL DL<=0.005 MIU/L-ACNC: 1.92 UIU/ML (ref 0.4–4)
WBC # BLD AUTO: 5.27 K/UL (ref 3.9–12.7)

## 2020-11-24 PROCEDURE — 43239 PR EGD, FLEX, W/BIOPSY, SGL/MULTI: ICD-10-PCS | Mod: ,,, | Performed by: INTERNAL MEDICINE

## 2020-11-24 PROCEDURE — D9220A PRA ANESTHESIA: Mod: ANES,,, | Performed by: ANESTHESIOLOGY

## 2020-11-24 PROCEDURE — 93010 ELECTROCARDIOGRAM REPORT: CPT | Mod: ,,, | Performed by: INTERNAL MEDICINE

## 2020-11-24 PROCEDURE — G0425 PR INPT TELEHEALTH CONSULT 30M: ICD-10-PCS | Mod: GT,,, | Performed by: PSYCHIATRY & NEUROLOGY

## 2020-11-24 PROCEDURE — U0002 COVID-19 LAB TEST NON-CDC: HCPCS | Performed by: EMERGENCY MEDICINE

## 2020-11-24 PROCEDURE — 85610 PROTHROMBIN TIME: CPT

## 2020-11-24 PROCEDURE — 88305 TISSUE EXAM BY PATHOLOGIST: CPT | Performed by: PATHOLOGY

## 2020-11-24 PROCEDURE — 93005 ELECTROCARDIOGRAM TRACING: CPT

## 2020-11-24 PROCEDURE — 81025 URINE PREGNANCY TEST: CPT

## 2020-11-24 PROCEDURE — 99285 EMERGENCY DEPT VISIT HI MDM: CPT | Mod: 25

## 2020-11-24 PROCEDURE — D9220A PRA ANESTHESIA: Mod: CRNA,,, | Performed by: NURSE ANESTHETIST, CERTIFIED REGISTERED

## 2020-11-24 PROCEDURE — 25000003 PHARM REV CODE 250: Performed by: NURSE ANESTHETIST, CERTIFIED REGISTERED

## 2020-11-24 PROCEDURE — 80053 COMPREHEN METABOLIC PANEL: CPT

## 2020-11-24 PROCEDURE — 37000008 HC ANESTHESIA 1ST 15 MINUTES: Performed by: INTERNAL MEDICINE

## 2020-11-24 PROCEDURE — 63600175 PHARM REV CODE 636 W HCPCS: Performed by: NURSE ANESTHETIST, CERTIFIED REGISTERED

## 2020-11-24 PROCEDURE — D9220A PRA ANESTHESIA: ICD-10-PCS | Mod: CRNA,,, | Performed by: NURSE ANESTHETIST, CERTIFIED REGISTERED

## 2020-11-24 PROCEDURE — 80061 LIPID PANEL: CPT

## 2020-11-24 PROCEDURE — 27201012 HC FORCEPS, HOT/COLD, DISP: Performed by: INTERNAL MEDICINE

## 2020-11-24 PROCEDURE — 84443 ASSAY THYROID STIM HORMONE: CPT

## 2020-11-24 PROCEDURE — G0425 INPT/ED TELECONSULT30: HCPCS | Mod: GT,,, | Performed by: PSYCHIATRY & NEUROLOGY

## 2020-11-24 PROCEDURE — 25500020 PHARM REV CODE 255: Performed by: EMERGENCY MEDICINE

## 2020-11-24 PROCEDURE — D9220A PRA ANESTHESIA: ICD-10-PCS | Mod: ANES,,, | Performed by: ANESTHESIOLOGY

## 2020-11-24 PROCEDURE — 37000009 HC ANESTHESIA EA ADD 15 MINS: Performed by: INTERNAL MEDICINE

## 2020-11-24 PROCEDURE — 43239 EGD BIOPSY SINGLE/MULTIPLE: CPT | Performed by: INTERNAL MEDICINE

## 2020-11-24 PROCEDURE — 88305 TISSUE EXAM BY PATHOLOGIST: ICD-10-PCS | Mod: 26,,, | Performed by: PATHOLOGY

## 2020-11-24 PROCEDURE — 43239 EGD BIOPSY SINGLE/MULTIPLE: CPT | Mod: ,,, | Performed by: INTERNAL MEDICINE

## 2020-11-24 PROCEDURE — 88305 TISSUE EXAM BY PATHOLOGIST: CPT | Mod: 26,,, | Performed by: PATHOLOGY

## 2020-11-24 PROCEDURE — 93010 EKG 12-LEAD: ICD-10-PCS | Mod: ,,, | Performed by: INTERNAL MEDICINE

## 2020-11-24 PROCEDURE — 82962 GLUCOSE BLOOD TEST: CPT

## 2020-11-24 PROCEDURE — 85025 COMPLETE CBC W/AUTO DIFF WBC: CPT

## 2020-11-24 PROCEDURE — 83735 ASSAY OF MAGNESIUM: CPT

## 2020-11-24 RX ORDER — PROPOFOL 10 MG/ML
VIAL (ML) INTRAVENOUS
Status: DISCONTINUED | OUTPATIENT
Start: 2020-11-24 | End: 2020-11-24

## 2020-11-24 RX ORDER — LIDOCAINE HYDROCHLORIDE 20 MG/ML
INJECTION, SOLUTION EPIDURAL; INFILTRATION; INTRACAUDAL; PERINEURAL
Status: DISCONTINUED
Start: 2020-11-24 | End: 2020-11-24 | Stop reason: HOSPADM

## 2020-11-24 RX ORDER — PROPOFOL 10 MG/ML
INJECTION, EMULSION INTRAVENOUS
Status: DISCONTINUED
Start: 2020-11-24 | End: 2020-11-24 | Stop reason: HOSPADM

## 2020-11-24 RX ORDER — LIDOCAINE HYDROCHLORIDE 20 MG/ML
INJECTION INTRAVENOUS
Status: DISCONTINUED | OUTPATIENT
Start: 2020-11-24 | End: 2020-11-24

## 2020-11-24 RX ORDER — SODIUM CHLORIDE 9 MG/ML
INJECTION, SOLUTION INTRAVENOUS CONTINUOUS PRN
Status: DISCONTINUED | OUTPATIENT
Start: 2020-11-24 | End: 2020-11-24

## 2020-11-24 RX ADMIN — PROPOFOL 10 MG: 10 INJECTION, EMULSION INTRAVENOUS at 12:11

## 2020-11-24 RX ADMIN — Medication 100 MG: at 12:11

## 2020-11-24 RX ADMIN — PROPOFOL 80 MG: 10 INJECTION, EMULSION INTRAVENOUS at 12:11

## 2020-11-24 RX ADMIN — SODIUM CHLORIDE: 0.9 INJECTION, SOLUTION INTRAVENOUS at 12:11

## 2020-11-24 RX ADMIN — PROPOFOL 20 MG: 10 INJECTION, EMULSION INTRAVENOUS at 12:11

## 2020-11-24 RX ADMIN — PROPOFOL 40 MG: 10 INJECTION, EMULSION INTRAVENOUS at 12:11

## 2020-11-24 RX ADMIN — IOHEXOL 75 ML: 350 INJECTION, SOLUTION INTRAVENOUS at 07:11

## 2020-11-24 NOTE — [employer] PATIENT INSTRUCTIONS
Discharge Summary/Instructions after an Endoscopic Procedure  Patient Name: Lilliana Sterling  Patient MRN: [id number]  Patient YOB: 1990  Tuesday, November 24, 2020  Sandor Johnson MD  RESTRICTIONS:  During your procedure today, you received medications for sedation.  These   medications may affect your judgment, balance and coordination.  Therefore,   for 24 hours, you have the following restrictions:   - DO NOT drive a car, operate machinery, make legal/financial decisions,   sign important papers or drink alcohol.    ACTIVITY:  Today: no heavy lifting, straining or running due to procedural   sedation/anesthesia.  The following day: return to full activity including work.  DIET:  Eat and drink normally unless instructed otherwise.     TREATMENT FOR COMMON SIDE EFFECTS:  - Mild abdominal pain, nausea, belching, bloating or excessive gas:  rest,   eat lightly and use a heating pad.  - Sore Throat: treat with throat lozenges and/or gargle with warm salt   water.  - Because air was used during the procedure, expelling large amounts of air   from your rectum or belching is normal.  - If a bowel prep was taken, you may not have a bowel movement for 1-3 days.    This is normal.  SYMPTOMS TO WATCH FOR AND REPORT TO YOUR PHYSICIAN:  1. Abdominal pain or bloating, other than gas cramps.  2. Chest pain.  3. Back pain.  4. Signs of infection such as: chills or fever occurring within 24 hours   after the procedure.  5. Rectal bleeding, which would show as bright red, maroon, or black stools.   (A tablespoon of blood from the rectum is not serious, especially if   hemorrhoids are present.)  6. Vomiting.  7. Weakness or dizziness.  GO DIRECTLY TO THE NEAREST EMERGENCY ROOM IF YOU HAVE ANY OF THE FOLLOWING:      Difficulty breathing              Chills and/or fever over 101 F   Persistent vomiting and/or vomiting blood   Severe abdominal pain   Severe chest pain   Black, tarry stools   Bleeding- more than one  tablespoon   Any other symptom or condition that you feel may need urgent attention  Your doctor recommends these additional instructions:  If any biopsies were taken, your doctors clinic will contact you in 1 to 2   weeks with any results.  - Patient has a contact number available for emergencies.  The signs and   symptoms of potential delayed complications were discussed with the   patient.  Return to normal activities tomorrow.  Written discharge   instructions were provided to the patient.   - Discharge patient to home.   - Resume previous diet.   - Continue present medications.   - Await pathology results.   For questions, problems or results please call your physician - Sandor Johnson MD at Work:  [phone].  [facility] can be reached at [phone]     IF A COMPLICATION OR EMERGENCY SITUATION ARISES AND YOU ARE UNABLE TO REACH   YOUR PHYSICIAN - GO DIRECTLY TO THE EMERGENCY ROOM.  Sandor Johnson MD  11/24/2020 12:45:24 PM  This report has been verified and signed electronically.  [employer]

## 2020-11-24 NOTE — Clinical Note
"Lilliana"Magda Sterling was seen and treated in our emergency department on 11/24/2020.  She may return to work on 11/27/2020.       If you have any questions or concerns, please don't hesitate to call.      Shea Graham MD"

## 2020-11-24 NOTE — ANESTHESIA PREPROCEDURE EVALUATION
11/24/2020  Lilliana Sterling is a 30 y.o., female.    Anesthesia Evaluation     I have reviewed the Nursing Notes.       Review of Systems  Anesthesia Hx:  No problems with previous Anesthesia   Social:  Non-Smoker    Cardiovascular:  Cardiovascular Normal Exercise tolerance: good     Pulmonary:   Denies Pneumonia Denies COPD. Asthma  Denies Shortness of breath.  Denies Recent URI.  Denies Sleep Apnea.    Renal/:  Renal/ Normal     Hepatic/GI:   No Bowel Prep. Denies PUD. GERD Denies Liver Disease. Denies Hepatitis.    Neurological:  Neurology Normal    Endocrine:  Endocrine Normal        Physical Exam  General:  Obesity    Airway/Jaw/Neck:  AIRWAY FINDINGS: Normal      Chest/Lungs:  Chest/Lungs Clear    Heart/Vascular:  Heart Findings: Normal       Mental Status:  Mental Status Findings: Normal        Anesthesia Plan  Type of Anesthesia, risks & benefits discussed:  Anesthesia Type:  general  Patient's Preference:   Intra-op Monitoring Plan: standard ASA monitors  Intra-op Monitoring Plan Comments:   Post Op Pain Control Plan:   Post Op Pain Control Plan Comments:   Induction:   IV  Beta Blocker:  Patient is not currently on a Beta-Blocker (No further documentation required).       Informed Consent: Patient understands risks and agrees with Anesthesia plan.  Questions answered. Anesthesia consent signed with patient.  ASA Score: 2     Day of Surgery Review of History & Physical:  There are no significant changes.  H&P update referred to the provider.         Ready For Surgery From Anesthesia Perspective.

## 2020-11-24 NOTE — PLAN OF CARE
Pt complaining of heaviness to bilateral arms as well as the beginnings of a headache. When trying to lift Right arm, pts arm drops. Denies blurred vision or numbness/tingling. Pt has equal  bilaterally. Dr Matute notified and at bedside performing assessment.

## 2020-11-24 NOTE — TRANSFER OF CARE
"Anesthesia Transfer of Care Note    Patient: Lilliana Sterling    Procedure(s) Performed: Procedure(s) (LRB):  EGD (ESOPHAGOGASTRODUODENOSCOPY) (N/A)    Patient location: GI    Anesthesia Type: general    Transport from OR: Transported from OR on room air with adequate spontaneous ventilation    Post pain: adequate analgesia    Post assessment: no apparent anesthetic complications and tolerated procedure well    Post vital signs: stable    Level of consciousness: sedated and responds to stimulation    Nausea/Vomiting: no nausea/vomiting    Complications: none    Transfer of care protocol was followed      Last vitals:   Visit Vitals  /72 (BP Location: Left arm, Patient Position: Lying)   Pulse 87   Temp 36.5 °C (97.7 °F) (Oral)   Resp 12   Ht 5' 7" (1.702 m)   Wt 101.6 kg (224 lb)   SpO2 97%   Breastfeeding No   BMI 35.08 kg/m²     "

## 2020-11-24 NOTE — ED PROVIDER NOTES
Encounter Date: 2020    SCRIBE #1 NOTE: I, Letty Jim, am scribing for, and in the presence of,  Shea Graham MD. I have scribed the following portions of the note - Other sections scribed: HPI, ROS, PE.       History     Chief Complaint   Patient presents with    right side weakness     pt. woke up from ENDO with right side weakness     CC: Weakness     30-year-old female presents to the ED complaining of RUE weakness. Pt woke up at 1:02 pm from anesthesia and felt her right arm seemed weaker compared to the left. Pt had procedure in prone position, she was not on her side. She denies any weakness to her lower extremities. She denies any numbness, difficulty with speech.. Pt received an endoscopy this morning due to Hx of abdominal pain. Denies chance of pregnancy.     The history is provided by the patient.     Review of patient's allergies indicates:   Allergen Reactions    Pcn [penicillins] Hives     Past Medical History:   Diagnosis Date    Asthma      Past Surgical History:   Procedure Laterality Date     SECTION       Family History   Problem Relation Age of Onset    Diabetes Paternal Grandmother      Social History     Tobacco Use    Smoking status: Never Smoker    Smokeless tobacco: Never Used   Substance Use Topics    Alcohol use: No    Drug use: No     Review of Systems   Constitutional: Negative for chills and fever.   HENT: Negative for congestion and sore throat.    Eyes: Negative for visual disturbance.   Respiratory: Negative for cough and shortness of breath.    Cardiovascular: Negative for chest pain.   Gastrointestinal: Negative for abdominal pain, nausea and vomiting.   Genitourinary: Negative for dysuria and vaginal discharge.   Skin: Negative for rash.   Neurological: Positive for weakness (RUE). Negative for speech difficulty, numbness and headaches.   Psychiatric/Behavioral: Negative for decreased concentration.       Physical Exam     Initial Vitals [20  1426]   BP Pulse Resp Temp SpO2   121/75 (!) 57 20 98.8 °F (37.1 °C) 99 %      MAP       --         Physical Exam    Nursing note and vitals reviewed.  Constitutional: She appears well-developed and well-nourished. She is not diaphoretic. No distress.   HENT:   Mouth/Throat: Oropharynx is clear and moist.   Eyes: Pupils are equal, round, and reactive to light.   Neck: Neck supple.   Cardiovascular: Normal rate and regular rhythm.   Pulmonary/Chest: Breath sounds normal.   Abdominal: Soft. There is no abdominal tenderness.   Musculoskeletal: No edema.   Neurological: She is alert and oriented to person, place, and time. No cranial nerve deficit or sensory deficit. GCS score is 15. GCS eye subscore is 4. GCS verbal subscore is 5. GCS motor subscore is 6.   Right 4/5 with  strength, arm flexion, and arm extension. 5/5 strength to lower extremities. No pronator drift. Normal finger to nose. Sensation to light touch intact.   Skin: Skin is warm and dry.   Psychiatric: She has a normal mood and affect.         ED Course   Procedures  Labs Reviewed   LIPID PANEL - Abnormal; Notable for the following components:       Result Value    HDL 33 (*)     All other components within normal limits   CBC W/ AUTO DIFFERENTIAL   COMPREHENSIVE METABOLIC PANEL   PROTIME-INR   TSH   MAGNESIUM   POCT GLUCOSE, HAND-HELD DEVICE   SARS-COV-2 RDRP GENE   POCT PROTIME-INR   POCT GLUCOSE     EKG Readings: (Independently Interpreted)   Sinus bradycardia, rate 53 beats per minute, normal GA interval,  milliseconds.  No STEMI, no malignant dysrhythmia.  Inverted T-waves noted in V2.       Imaging Results          CTA Head and Neck (xpd) (Final result)  Result time 11/24/20 20:02:17   Procedure changed from CTA Brain     Final result by Felicitas Valladares MD (11/24/20 20:02:17)                 Impression:      No acute abnormality.  Hypoplastic or absent left vertebral artery, which terminates as a PICA.  If persistent neurological  abnormalities, consider MRI with diffusion-weighted sequencing.      Electronically signed by: Felicitas Valladares  Date:    11/24/2020  Time:    20:02             Narrative:    EXAMINATION:  CTA HEAD AND NECK (XPD)    CLINICAL HISTORY:  right arm weakness;    TECHNIQUE:  Non contrast low dose axial images were obtained through the head. CT angiogram was performed from the level of the tapan to the top of the head following the IV administration of 75mL of Omnipaque 350.   Sagittal and coronal reconstructions and maximum intensity projection reconstructions were performed. Arterial stenosis percentages are based on NASCET measurement criteria.    COMPARISON:  CT head from 11/24/2020 at 14:35    FINDINGS:  Intracranial Compartment:    Ventricles and sulci are normal in size for age without evidence of hydrocephalus. No extra-axial blood or fluid collections.    The brain parenchyma appears normal. No parenchymal mass, hemorrhage, edema, or major vascular distribution infarct.    Skull/Extracranial Contents (limited evaluation): No fracture. Mastoid air cells and paranasal sinuses are essentially clear.    Non-Vascular Structures of the Neck/Thoracic Inlet (limited evaluation): Normal.    Aorta: Normal 3 vessel arch.    Extracranial carotid circulation: No hemodynamically significant stenosis, aneurysmal dilatation, or dissection.    Extracranial vertebral circulation: No hemodynamically significant stenosis, aneurysmal dilatation, or dissection.    Intracranial Arteries: No focal high-grade stenosis, occlusion, or aneurysm.    Venous structures (limited evaluation): Normal.  Stat                               X-Ray Chest AP Portable (Final result)  Result time 11/24/20 15:54:33    Final result by Sergio Torres MD (11/24/20 15:54:33)                 Impression:      No acute process.      Electronically signed by: Sergio Torres MD  Date:    11/24/2020  Time:    15:54             Narrative:    EXAMINATION:  XR CHEST AP  PORTABLE    CLINICAL HISTORY:  Stroke;    TECHNIQUE:  Single frontal view of the chest was performed.    COMPARISON:  None    FINDINGS:  Monitoring EKG leads are present.  The trachea is unremarkable.  The cardiomediastinal silhouette is within normal limits.  The hilar structures are unremarkable.  The hemidiaphragms are within normal limits.  There is no evidence of free air beneath the hemidiaphragms.  There are no pleural effusions.  There is no evidence of a pneumothorax.  There is no evidence of pneumomediastinum.  No airspace opacity is present.  The osseous structures are unremarkable.                               CT Head Without Contrast (Final result)  Result time 11/24/20 15:42:44    Final result by Roberto Carlos Adrian Jr., MD (11/24/20 15:42:44)                 Impression:      Limited study demonstrating no gross acute intracranial abnormality      Electronically signed by: Roberto Carlos Keita Jr  Date:    11/24/2020  Time:    15:42             Narrative:    EXAMINATION:  CT HEAD WITHOUT CONTRAST    CLINICAL HISTORY:  Neuro deficit, acute, stroke suspected;    TECHNIQUE:  Low dose axial images were obtained through the head.  Coronal and sagittal reformations were also performed. Contrast was not administered.    COMPARISON:  None.    FINDINGS:  Images are degraded by patient motion artifact mildly.  Grossly, the brain parenchyma and CSF spaces are within normal limits for age.There is no evidence of hemorrhage, mass, or acute infarct.    The visualized paranasal sinuses are clear.  There are well-circumscribed radiolucencies involving the frontal bone and squamous left temporal bone demonstrating fat density compatible with benign lesions.                                 Medical Decision Making:   History:   Old Medical Records: I decided to obtain old medical records.  Initial Assessment:   29 yo F presents from endoscopy suite with RUE weakness, noticed at 13:02. Exam notable for 4/5 strength in RUE,  no pronator drift, otherwise CN 2-12, strength in LUE and bilateral lower extremities intact, and sensation to light touch intact in all 4 extremities. Stroke activation called from triage. Ddx CVA, ?reaction to anesthesia, peripheral nerve palsy.     Independently Interpreted Test(s):   I have ordered and independently interpreted EKG Reading(s) - see prior notes  Clinical Tests:   Lab Tests: Ordered and Reviewed  Radiological Study: Ordered and Reviewed  Medical Tests: Ordered and Reviewed  ED Management:  1503: Tele Stroke Consult- Spoke with Dr. Walter. She did not appreciate any motor deficits on her exam. She does not recommend any further work up or treatment including Aspirin.    Update:  Patient reassessed, no motor weakness in RUE, patient denies complaints and is requesting discharge.     Update:  CTA shows hypoplastic left vertebral artery, no intracranial high grade stenosis, occlusion, or aneurysm. Patient continues to denei complaints. Will refer to neurology clinic for outpatient follow up.     Additional MDM:     NIH Stroke Scale:   Level of consciousness = 0 - alert  LOC questions = 0 - answers both correctly  LOC commands = 0 - performs both correctly  Best gaze = 0 - normal  Visual = 0 - no visual loss  Facial palsy = 0 - normal  Motor left arm =  0 - no drift  Motor right arm =  0 - no drift (4/5 strength with , arm flexion, arm extension)  Motor left leg = 0 - no drift  Motor right leg =  0 - no drift  Limb ataxia = 0 - absent  Sensory = 0 - normal  Best language = 0 - no aphasia  Dysarthria = 0 - normal articulation  Extinction and inattention = 0 - no neglect  NIH Stroke Scale Total = 0         Scribe Attestation:   Scribe #1: I performed the above scribed service and the documentation accurately describes the services I performed. I attest to the accuracy of the note.            ED Course as of Nov 24 2148   Tue Nov 24, 2020   9655 Case discussed with Dr. Deras- recommends CTA head and  neck, if normal pt can be discharged home. Explained test results and care plan to patient.     [LH]      ED Course User Index  [LH] Shea Graham MD            Clinical Impression:       ICD-10-CM ICD-9-CM   1. Right arm weakness  R29.898 729.89   2. Stroke-like symptom  R29.90 781.99                          ED Disposition Condition    Discharge Stable        ED Prescriptions     None        Follow-up Information     Follow up With Specialties Details Why Contact Info    Floyd Caldwell MD Neurology Schedule an appointment as soon as possible for a visit in 1 week To recheck your symptoms [address]  [address]  [facility] [address]  [phone]      [facility]  As needed, If symptoms worsen [address] [facility] [address] [phone]                                I, Shea Graham MD, personally performed the services described in this documentation. All medical record entries made by the scribe were at my direction and in my presence.  I have reviewed the chart and agree that the record reflects my personal performance and is accurate and complete.    This dictation has been generated using M-Modal Fluency Direct dictation; some phonetic errors may occur.          Shea Graham MD  11/24/20 8810

## 2020-11-24 NOTE — ED TRIAGE NOTES
Pt reports to ED from home after a EGD scope this morning. After scope pt reports R arm numbness and tingling. Pt denies pain. Pt does not have slurred speech, or facial drooping. Pt ambulates with a steady gait. Stoke alert called by MD Graham. Pt denies SOB, CP, cough ,fever,chills, dysuria. Pt had EGD due to N, abdominal fullness.

## 2020-11-24 NOTE — INTERVAL H&P NOTE
The patient has been examined and the H&P has been reviewed:    There is no interval changes since last encounter.  EGD: Early Satiety  Sedation: GA  ASA: Per anesthesia  Mallampati: Per anesthesia    Endoscopy risks, benefits and alternative options discussed and understood by patient/family.          Active Hospital Problems    Diagnosis  POA    GERD (gastroesophageal reflux disease) [K21.9]  Yes      Resolved Hospital Problems   No resolved problems to display.

## 2020-11-24 NOTE — ANESTHESIA POSTPROCEDURE EVALUATION
Anesthesia Post Evaluation    Patient: Lilliana Sterling    Procedure(s) Performed: Procedure(s) (LRB):  EGD (ESOPHAGOGASTRODUODENOSCOPY) (N/A)    Final Anesthesia Type: general    Patient location during evaluation: PACU  Patient participation: Yes- Able to Participate  Level of consciousness: awake and alert  Post-procedure vital signs: reviewed and stable  Pain management: adequate  Airway patency: patent    PONV status at discharge: No PONV  Anesthetic complications: no      Cardiovascular status: blood pressure returned to baseline and hemodynamically stable  Respiratory status: unassisted and spontaneous ventilation  Hydration status: euvolemic  Follow-up not needed.  Comments: In Recovery pt felt like her right arm was weak. However, upon testing against me she did display, with much coaxing,  Full and symmetric strength.  Addendum: 14:10 Pt reported to again demonstrate and c/o numbness and weakness in right upper  Extremity. I stated that I would come to re-evaluate but as I was walking over I learned that the GI doc  Asked that she be brought to the ED for evaluation.          Vitals Value Taken Time   /93 11/24/20 1302   Temp 36.5 °C (97.7 °F) 11/24/20 1247   Pulse 77 11/24/20 1302   Resp 18 11/24/20 1302   SpO2 99 % 11/24/20 1302         No case tracking events are documented in the log.      Pain/Fabiana Score: Fabiana Score: 10 (11/24/2020  1:02 PM)

## 2020-11-24 NOTE — TELEPHONE ENCOUNTER
----- Message from Matti Rodriguez sent at 11/24/2020 10:38 AM CST -----  Patient called to confirm earlier arrival time for her EGD, callback [phone]

## 2020-11-24 NOTE — PLAN OF CARE
Pt assisted to restroom. When standing, pt is leaning to left side. Pt complains of dizziness and numbness to right arm. Dr. Mendez notified.

## 2020-11-24 NOTE — TELEPHONE ENCOUNTER
Pt was given the number to [facility] to find out time of her procedure

## 2020-11-24 NOTE — DISCHARGE INSTRUCTIONS

## 2020-11-25 LAB
FINAL PATHOLOGIC DIAGNOSIS: NORMAL
GROSS: NORMAL
Lab: NORMAL

## 2020-11-25 NOTE — HPI
29 y/o female who was LKN at noon today at which time she had an endoscopy procedure.  Upon awaking from the procedure at approximately 1:02 p.m., the patient complained of heaviness of both upper extremities.  She was brought to the emergency room for request of the physician who performed the procedure.  The patient also states that she had a headache at that time which was bifrontal.  She is not usually bother with headache.  She states that her past medical history is basically unremarkable.

## 2020-11-25 NOTE — SUBJECTIVE & OBJECTIVE
"  Woke up with symptoms?: no    Recent bleeding noted: no  Does the patient take any Blood Thinners? no  Medications: No relevant medications      Past Medical History: no relevant history    Past Surgical History: no major surgeries within the last 2 weeks    Family History: no relevant history    Social History: no smoking, no drinking, no drugs    Allergies: Pcn [Penicillins]     Review of Systems   Constitutional: Negative for chills and fever.   HENT: Negative for congestion and sore throat.    Eyes: Negative for visual disturbance.   Respiratory: Negative for shortness of breath.    Cardiovascular: Negative for chest pain and palpitations.   Gastrointestinal: Negative for blood in stool, diarrhea, nausea and vomiting.   Genitourinary: Negative for difficulty urinating and hematuria.   Musculoskeletal: Negative for back pain and neck pain.   Neurological: Positive for weakness and headaches. Negative for dizziness and speech difficulty.     Objective:   Vitals: Blood pressure 112/79, pulse (!) 55, temperature 98.8 °F (37.1 °C), temperature source Oral, resp. rate 20, height 5' 7" (1.702 m), weight 99.8 kg (220 lb), last menstrual period 11/15/2020, SpO2 99 %.     CT READ: Yes  No hemmorhage. No mass effect. No early infarct signs.     Physical Exam  Vitals signs reviewed.   Constitutional:       Appearance: Normal appearance. She is well-developed.   HENT:      Head: Normocephalic and atraumatic.      Nose: Nose normal.   Eyes:      Pupils: Pupils are equal, round, and reactive to light.   Cardiovascular:      Rate and Rhythm: Normal rate and regular rhythm.   Pulmonary:      Effort: Pulmonary effort is normal.   Neurological:      Mental Status: She is alert and oriented to person, place, and time.      Cranial Nerves: No cranial nerve deficit.      Sensory: No sensory deficit.      Motor: No weakness.      Coordination: Coordination normal.   Psychiatric:         Mood and Affect: Mood normal.           "

## 2020-11-25 NOTE — CONSULTS
[facility]  Vascular Neurology  Comprehensive Stroke Center  Tele-Consultation Note      Consults    Consulting Provider: PAULINA PENN  Current Providers  No providers found    Patient Location:  [facility] EMERGENCY DEPARTMENT Emergency Department  Spoke hospital nurse at bedside with patient assisting consultant.     Patient information was obtained from patient.         Assessment/Plan:     STROKE DOCUMENTATION     Acute Stroke Times:   Acute Stroke Times   Last Known Normal Date: 11/24/20  Last Known Normal Time: 1200  Symptom Onset Date: 11/24/20  Symptom Onset Time: 1200  Stroke Team Called Date: 11/24/20  Stroke Team Called Time: 1423  Stroke Team Arrival Date: 11/24/20  Stroke Team Arrival Time: 1433  CT Interpretation Time: 1443    NIH Scale:  Interval: baseline  1a. Level of Consciousness: 0-->Alert, keenly responsive  1b. LOC Questions: 0-->Answers both questions correctly  1c. LOC Commands: 0-->Performs both tasks correctly  2. Best Gaze: 0-->Normal  3. Visual: 0-->No visual loss  4. Facial Palsy: 0-->Normal symmetrical movements  5a. Motor Arm, Left: 0-->No drift, limb holds 90 (or 45) degrees for full 10 secs  5b. Motor Arm, Right: 0-->No drift, limb holds 90 (or 45) degrees for full 10 secs  6a. Motor Leg, Left: 0-->No drift, leg holds 30 degree position for full 5 secs  6b. Motor Leg, Right: 0-->No drift, leg holds 30 degree position for full 5 secs  7. Limb Ataxia: 0-->Absent  8. Sensory: 0-->Normal, no sensory loss  9. Best Language: 0-->No aphasia, normal  10. Dysarthria: 0-->Normal  11. Extinction and Inattention (formerly Neglect): 0-->No abnormality  Total (NIH Stroke Scale): 0     Modified [place] Score: 0  Diggs Coma Scale:    ABCD2 Score:    [id number] Score:   HAS -BLED Score:   ICH Score:   Hunt & Edmonds Classification:       Diagnoses:   No new Assessment & Plan notes have been filed under this hospital service since the last note was  "generated.  Service: Vascular Neurology      Blood pressure 112/79, pulse (!) 55, temperature 98.8 °F (37.1 °C), temperature source Oral, resp. rate 20, height 5' 7" (1.702 m), weight 99.8 kg (220 lb), last menstrual period 11/15/2020, SpO2 99 %.  Alteplase Eligible?: Yes  Alteplase Recommendation: Alteplase not recommended due to Symptoms resolved   Possible Interventional Revascularization Candidate? No; No ischemic penumbra    Disposition Recommendation: pending further studies  discharge from ED    Subjective:     History of Present Illness:  31 y/o female who was LKN at noon today at which time she had an endoscopy procedure.  Upon awaking from the procedure at approximately 1:02 p.m., the patient complained of heaviness of both upper extremities.  She was brought to the emergency room for request of the physician who performed the procedure.  The patient also states that she had a headache at that time which was bifrontal.  She is not usually bother with headache.  She states that her past medical history is basically unremarkable.      Woke up with symptoms?: no    Recent bleeding noted: no  Does the patient take any Blood Thinners? no  Medications: No relevant medications      Past Medical History: no relevant history    Past Surgical History: no major surgeries within the last 2 weeks    Family History: no relevant history    Social History: no smoking, no drinking, no drugs    Allergies: Pcn [Penicillins]     Review of Systems   Constitutional: Negative for chills and fever.   HENT: Negative for congestion and sore throat.    Eyes: Negative for visual disturbance.   Respiratory: Negative for shortness of breath.    Cardiovascular: Negative for chest pain and palpitations.   Gastrointestinal: Negative for blood in stool, diarrhea, nausea and vomiting.   Genitourinary: Negative for difficulty urinating and hematuria.   Musculoskeletal: Negative for back pain and neck pain.   Neurological: Positive for " "weakness and headaches. Negative for dizziness and speech difficulty.     Objective:   Vitals: Blood pressure 112/79, pulse (!) 55, temperature 98.8 °F (37.1 °C), temperature source Oral, resp. rate 20, height 5' 7" (1.702 m), weight 99.8 kg (220 lb), last menstrual period 11/15/2020, SpO2 99 %.     CT READ: Yes  No hemmorhage. No mass effect. No early infarct signs.     Physical Exam  Vitals signs reviewed.   Constitutional:       Appearance: Normal appearance. She is well-developed.   HENT:      Head: Normocephalic and atraumatic.      Nose: Nose normal.   Eyes:      Pupils: Pupils are equal, round, and reactive to light.   Cardiovascular:      Rate and Rhythm: Normal rate and regular rhythm.   Pulmonary:      Effort: Pulmonary effort is normal.   Neurological:      Mental Status: She is alert and oriented to person, place, and time.      Cranial Nerves: No cranial nerve deficit.      Sensory: No sensory deficit.      Motor: No weakness.      Coordination: Coordination normal.   Psychiatric:         Mood and Affect: Mood normal.               Recommended the emergency room physician to have a brief discussion with the patient and/or family if available regarding the risks and benefits of treatment, and to briefly document the occurrence of that discussion in his clinical encounter note.     The attending portion of this evaluation, treatment, and documentation was performed per Caitlin Walter MD via audiovisual.    Billing code:  (non-stroke, some mimics)    · This patient has neurological symptom(s)/condition/illness, with minimal potential for morbidity and mortality.  · There is a low probability for acute neurological change leading to clinical and possibly life-threatening deterioration requiring highest level of physician preparedness for urgent intervention.  · Care was coordinated with other physicians involved in the patient's care.  · Radiologic studies and laboratory data were reviewed and " interpreted, and plan of care was re-assessed based on the results.  · Diagnosis, treatment options and prognosis may have been discussed with the patient and/or family members or caregiver.      In your opinion, this was a: Tier 1 Van Negative    Consult End Time: 3839     Caitlin Walter MD  [facility] Stroke Center  Vascular Neurology   [facility]

## 2020-12-02 ENCOUNTER — PATIENT MESSAGE (OUTPATIENT)
Dept: NEUROLOGY | Facility: CLINIC | Age: 30
End: 2020-12-02

## 2020-12-04 ENCOUNTER — TELEPHONE (OUTPATIENT)
Dept: GASTROENTEROLOGY | Facility: CLINIC | Age: 30
End: 2020-12-04

## 2020-12-04 NOTE — TELEPHONE ENCOUNTER
----- Message from Sandor Johnson MD sent at 12/4/2020  3:07 PM CST -----  Please notify patient, the stomach biopsies did not reveal any H.pylori.

## 2020-12-07 ENCOUNTER — TELEPHONE (OUTPATIENT)
Dept: GASTROENTEROLOGY | Facility: CLINIC | Age: 30
End: 2020-12-07

## 2020-12-07 NOTE — TELEPHONE ENCOUNTER
Spoke with patient , results given as written by Dr. Johnson  Pt verbalizes understadning and appreciates the call.  Thanks MA

## 2020-12-07 NOTE — TELEPHONE ENCOUNTER
----- Message from Dmitriy Herman sent at 12/7/2020 10:09 AM CST -----  Contact: patient  Patient is returning a phone call regarding test results     Lilliana at [phone]

## 2021-02-01 ENCOUNTER — OFFICE VISIT (OUTPATIENT)
Dept: NEUROLOGY | Facility: CLINIC | Age: 31
End: 2021-02-01
Payer: COMMERCIAL

## 2021-02-01 VITALS
HEIGHT: 67 IN | HEART RATE: 66 BPM | WEIGHT: 222 LBS | BODY MASS INDEX: 34.84 KG/M2 | SYSTOLIC BLOOD PRESSURE: 112 MMHG | DIASTOLIC BLOOD PRESSURE: 61 MMHG

## 2021-02-01 DIAGNOSIS — R29.898 RUE WEAKNESS: ICD-10-CM

## 2021-02-01 DIAGNOSIS — G43.709 CHRONIC MIGRAINE WITHOUT AURA WITHOUT STATUS MIGRAINOSUS, NOT INTRACTABLE: Primary | ICD-10-CM

## 2021-02-01 PROCEDURE — 3008F PR BODY MASS INDEX (BMI) DOCUMENTED: ICD-10-PCS | Mod: CPTII,S$GLB,, | Performed by: NEUROLOGICAL SURGERY

## 2021-02-01 PROCEDURE — 3008F BODY MASS INDEX DOCD: CPT | Mod: CPTII,S$GLB,, | Performed by: NEUROLOGICAL SURGERY

## 2021-02-01 PROCEDURE — 99204 OFFICE O/P NEW MOD 45 MIN: CPT | Mod: S$GLB,,, | Performed by: NEUROLOGICAL SURGERY

## 2021-02-01 PROCEDURE — 99204 PR OFFICE/OUTPT VISIT, NEW, LEVL IV, 45-59 MIN: ICD-10-PCS | Mod: S$GLB,,, | Performed by: NEUROLOGICAL SURGERY

## 2021-02-01 PROCEDURE — 99999 PR PBB SHADOW E&M-EST. PATIENT-LVL IV: ICD-10-PCS | Mod: PBBFAC,,, | Performed by: NEUROLOGICAL SURGERY

## 2021-02-01 PROCEDURE — 99999 PR PBB SHADOW E&M-EST. PATIENT-LVL IV: CPT | Mod: PBBFAC,,, | Performed by: NEUROLOGICAL SURGERY

## 2021-02-01 PROCEDURE — 1125F PR PAIN SEVERITY QUANTIFIED, PAIN PRESENT: ICD-10-PCS | Mod: S$GLB,,, | Performed by: NEUROLOGICAL SURGERY

## 2021-02-01 PROCEDURE — 1125F AMNT PAIN NOTED PAIN PRSNT: CPT | Mod: S$GLB,,, | Performed by: NEUROLOGICAL SURGERY

## 2021-02-01 RX ORDER — ERENUMAB-AOOE 70 MG/ML
70 INJECTION SUBCUTANEOUS
Qty: 1 ML | Refills: 5 | Status: SHIPPED | OUTPATIENT
Start: 2021-02-01 | End: 2021-02-23 | Stop reason: SDUPTHER

## 2021-02-15 ENCOUNTER — HOSPITAL ENCOUNTER (OUTPATIENT)
Dept: RADIOLOGY | Facility: HOSPITAL | Age: 31
Discharge: HOME | End: 2021-02-15
Attending: NEUROLOGICAL SURGERY
Payer: COMMERCIAL

## 2021-02-15 DIAGNOSIS — R29.898 RUE WEAKNESS: ICD-10-CM

## 2021-02-15 DIAGNOSIS — G43.709 CHRONIC MIGRAINE WITHOUT AURA WITHOUT STATUS MIGRAINOSUS, NOT INTRACTABLE: ICD-10-CM

## 2021-02-15 PROCEDURE — 70544 MR ANGIOGRAPHY HEAD W/O DYE: CPT | Mod: TC,PO

## 2021-02-15 PROCEDURE — 70551 MRI BRAIN STEM W/O DYE: CPT | Mod: 26,,, | Performed by: RADIOLOGY

## 2021-02-15 PROCEDURE — 70544 MRA BRAIN WITHOUT CONTRAST: ICD-10-PCS | Mod: 26,59,, | Performed by: RADIOLOGY

## 2021-02-15 PROCEDURE — 70551 MRI BRAIN WITHOUT CONTRAST: ICD-10-PCS | Mod: 26,,, | Performed by: RADIOLOGY

## 2021-02-15 PROCEDURE — 70551 MRI BRAIN STEM W/O DYE: CPT | Mod: TC,PO

## 2021-02-15 PROCEDURE — 70544 MR ANGIOGRAPHY HEAD W/O DYE: CPT | Mod: 26,59,, | Performed by: RADIOLOGY

## 2021-02-23 ENCOUNTER — OFFICE VISIT (OUTPATIENT)
Dept: NEUROLOGY | Facility: CLINIC | Age: 31
End: 2021-02-23
Payer: COMMERCIAL

## 2021-02-23 VITALS
DIASTOLIC BLOOD PRESSURE: 87 MMHG | HEART RATE: 67 BPM | HEIGHT: 67 IN | WEIGHT: 222 LBS | BODY MASS INDEX: 34.84 KG/M2 | SYSTOLIC BLOOD PRESSURE: 130 MMHG

## 2021-02-23 DIAGNOSIS — G93.5 CHIARI MALFORMATION TYPE I: ICD-10-CM

## 2021-02-23 DIAGNOSIS — G43.709 CHRONIC MIGRAINE WITHOUT AURA WITHOUT STATUS MIGRAINOSUS, NOT INTRACTABLE: Primary | ICD-10-CM

## 2021-02-23 PROCEDURE — 99999 PR PBB SHADOW E&M-EST. PATIENT-LVL III: ICD-10-PCS | Mod: PBBFAC,,, | Performed by: NEUROLOGICAL SURGERY

## 2021-02-23 PROCEDURE — 3008F BODY MASS INDEX DOCD: CPT | Mod: CPTII,S$GLB,, | Performed by: NEUROLOGICAL SURGERY

## 2021-02-23 PROCEDURE — 1126F PR PAIN SEVERITY QUANTIFIED, NO PAIN PRESENT: ICD-10-PCS | Mod: S$GLB,,, | Performed by: NEUROLOGICAL SURGERY

## 2021-02-23 PROCEDURE — 3008F PR BODY MASS INDEX (BMI) DOCUMENTED: ICD-10-PCS | Mod: CPTII,S$GLB,, | Performed by: NEUROLOGICAL SURGERY

## 2021-02-23 PROCEDURE — 99214 OFFICE O/P EST MOD 30 MIN: CPT | Mod: S$GLB,,, | Performed by: NEUROLOGICAL SURGERY

## 2021-02-23 PROCEDURE — 99999 PR PBB SHADOW E&M-EST. PATIENT-LVL III: CPT | Mod: PBBFAC,,, | Performed by: NEUROLOGICAL SURGERY

## 2021-02-23 PROCEDURE — 1126F AMNT PAIN NOTED NONE PRSNT: CPT | Mod: S$GLB,,, | Performed by: NEUROLOGICAL SURGERY

## 2021-02-23 PROCEDURE — 99214 PR OFFICE/OUTPT VISIT, EST, LEVL IV, 30-39 MIN: ICD-10-PCS | Mod: S$GLB,,, | Performed by: NEUROLOGICAL SURGERY

## 2021-02-23 RX ORDER — ERENUMAB-AOOE 70 MG/ML
70 INJECTION SUBCUTANEOUS
Qty: 1 ML | Refills: 5 | Status: SHIPPED | OUTPATIENT
Start: 2021-02-23 | End: 2021-05-31

## 2021-04-15 ENCOUNTER — PATIENT MESSAGE (OUTPATIENT)
Dept: RESEARCH | Facility: HOSPITAL | Age: 31
End: 2021-04-15

## 2021-05-29 ENCOUNTER — HOSPITAL ENCOUNTER (EMERGENCY)
Facility: HOSPITAL | Age: 31
Discharge: HOME | End: 2021-05-29
Attending: EMERGENCY MEDICINE
Payer: COMMERCIAL

## 2021-05-29 VITALS
DIASTOLIC BLOOD PRESSURE: 59 MMHG | OXYGEN SATURATION: 98 % | SYSTOLIC BLOOD PRESSURE: 145 MMHG | HEIGHT: 67 IN | TEMPERATURE: 99 F | HEART RATE: 70 BPM | WEIGHT: 220 LBS | RESPIRATION RATE: 17 BRPM | BODY MASS INDEX: 34.53 KG/M2

## 2021-05-29 DIAGNOSIS — O20.0 THREATENED MISCARRIAGE: Primary | ICD-10-CM

## 2021-05-29 LAB
ABO + RH BLD: NORMAL
ALBUMIN SERPL BCP-MCNC: 3.7 G/DL (ref 3.5–5.2)
ALP SERPL-CCNC: 63 U/L (ref 55–135)
ALT SERPL W/O P-5'-P-CCNC: 8 U/L (ref 10–44)
ANION GAP SERPL CALC-SCNC: 9 MMOL/L (ref 8–16)
AST SERPL-CCNC: 14 U/L (ref 10–40)
B-HCG UR QL: POSITIVE
BASOPHILS # BLD AUTO: 0.03 K/UL (ref 0–0.2)
BASOPHILS NFR BLD: 0.4 % (ref 0–1.9)
BILIRUB SERPL-MCNC: 0.2 MG/DL (ref 0.1–1)
BILIRUB UR QL STRIP: NEGATIVE
BLD GP AB SCN CELLS X3 SERPL QL: NORMAL
BUN SERPL-MCNC: 10 MG/DL (ref 6–20)
CALCIUM SERPL-MCNC: 9.3 MG/DL (ref 8.7–10.5)
CHLORIDE SERPL-SCNC: 107 MMOL/L (ref 95–110)
CLARITY UR: CLEAR
CO2 SERPL-SCNC: 20 MMOL/L (ref 23–29)
COLOR UR: YELLOW
CREAT SERPL-MCNC: 0.8 MG/DL (ref 0.5–1.4)
CTP QC/QA: YES
DIFFERENTIAL METHOD: ABNORMAL
EOSINOPHIL # BLD AUTO: 0 K/UL (ref 0–0.5)
EOSINOPHIL NFR BLD: 0.4 % (ref 0–8)
ERYTHROCYTE [DISTWIDTH] IN BLOOD BY AUTOMATED COUNT: 15 % (ref 11.5–14.5)
EST. GFR  (AFRICAN AMERICAN): >60 ML/MIN/1.73 M^2
EST. GFR  (NON AFRICAN AMERICAN): >60 ML/MIN/1.73 M^2
GLUCOSE SERPL-MCNC: 87 MG/DL (ref 70–110)
GLUCOSE UR QL STRIP: NEGATIVE
HCG INTACT+B SERPL-ACNC: NORMAL MIU/ML
HCT VFR BLD AUTO: 36.7 % (ref 37–48.5)
HGB BLD-MCNC: 12.1 G/DL (ref 12–16)
HGB UR QL STRIP: ABNORMAL
IMM GRANULOCYTES # BLD AUTO: 0.03 K/UL (ref 0–0.04)
IMM GRANULOCYTES NFR BLD AUTO: 0.4 % (ref 0–0.5)
KETONES UR QL STRIP: NEGATIVE
LEUKOCYTE ESTERASE UR QL STRIP: NEGATIVE
LIPASE SERPL-CCNC: 20 U/L (ref 4–60)
LYMPHOCYTES # BLD AUTO: 1.7 K/UL (ref 1–4.8)
LYMPHOCYTES NFR BLD: 22.3 % (ref 18–48)
MCH RBC QN AUTO: 28 PG (ref 27–31)
MCHC RBC AUTO-ENTMCNC: 33 G/DL (ref 32–36)
MCV RBC AUTO: 85 FL (ref 82–98)
MICROSCOPIC COMMENT: NORMAL
MONOCYTES # BLD AUTO: 0.9 K/UL (ref 0.3–1)
MONOCYTES NFR BLD: 12.6 % (ref 4–15)
NEUTROPHILS # BLD AUTO: 4.8 K/UL (ref 1.8–7.7)
NEUTROPHILS NFR BLD: 63.9 % (ref 38–73)
NITRITE UR QL STRIP: NEGATIVE
NRBC BLD-RTO: 0 /100 WBC
PH UR STRIP: 6 [PH] (ref 5–8)
PLATELET # BLD AUTO: 210 K/UL (ref 150–450)
PMV BLD AUTO: 11.5 FL (ref 9.2–12.9)
POTASSIUM SERPL-SCNC: 4.3 MMOL/L (ref 3.5–5.1)
PROT SERPL-MCNC: 7.4 G/DL (ref 6–8.4)
PROT UR QL STRIP: NEGATIVE
RBC # BLD AUTO: 4.32 M/UL (ref 4–5.4)
RBC #/AREA URNS HPF: 0 /HPF (ref 0–4)
SODIUM SERPL-SCNC: 136 MMOL/L (ref 136–145)
SP GR UR STRIP: 1.01 (ref 1–1.03)
SQUAMOUS #/AREA URNS HPF: 2 /HPF
URN SPEC COLLECT METH UR: ABNORMAL
UROBILINOGEN UR STRIP-ACNC: NEGATIVE EU/DL
WBC # BLD AUTO: 7.46 K/UL (ref 3.9–12.7)

## 2021-05-29 PROCEDURE — 84702 CHORIONIC GONADOTROPIN TEST: CPT | Performed by: PHYSICIAN ASSISTANT

## 2021-05-29 PROCEDURE — 85025 COMPLETE CBC W/AUTO DIFF WBC: CPT | Performed by: PHYSICIAN ASSISTANT

## 2021-05-29 PROCEDURE — 81000 URINALYSIS NONAUTO W/SCOPE: CPT | Performed by: PHYSICIAN ASSISTANT

## 2021-05-29 PROCEDURE — 83690 ASSAY OF LIPASE: CPT | Performed by: PHYSICIAN ASSISTANT

## 2021-05-29 PROCEDURE — 80053 COMPREHEN METABOLIC PANEL: CPT | Performed by: PHYSICIAN ASSISTANT

## 2021-05-29 PROCEDURE — 81025 URINE PREGNANCY TEST: CPT | Performed by: PHYSICIAN ASSISTANT

## 2021-05-29 PROCEDURE — 96360 HYDRATION IV INFUSION INIT: CPT

## 2021-05-29 PROCEDURE — 25000003 PHARM REV CODE 250: Performed by: PHYSICIAN ASSISTANT

## 2021-05-29 PROCEDURE — 96361 HYDRATE IV INFUSION ADD-ON: CPT

## 2021-05-29 PROCEDURE — 99284 EMERGENCY DEPT VISIT MOD MDM: CPT | Mod: 25

## 2021-05-29 PROCEDURE — 86900 BLOOD TYPING SEROLOGIC ABO: CPT | Performed by: PHYSICIAN ASSISTANT

## 2021-05-29 RX ORDER — ONDANSETRON 2 MG/ML
8 INJECTION INTRAMUSCULAR; INTRAVENOUS
Status: DISCONTINUED | OUTPATIENT
Start: 2021-05-29 | End: 2021-05-29 | Stop reason: HOSPADM

## 2021-05-29 RX ORDER — ONDANSETRON 4 MG/1
8 TABLET, FILM COATED ORAL EVERY 6 HOURS PRN
Qty: 30 TABLET | Refills: 0 | Status: SHIPPED | OUTPATIENT
Start: 2021-05-29 | End: 2021-06-28

## 2021-05-29 RX ORDER — ACETAMINOPHEN 500 MG
1000 TABLET ORAL
Status: COMPLETED | OUTPATIENT
Start: 2021-05-29 | End: 2021-05-29

## 2021-05-29 RX ADMIN — ACETAMINOPHEN 1000 MG: 500 TABLET, FILM COATED ORAL at 08:05

## 2021-05-29 RX ADMIN — SODIUM CHLORIDE 1000 ML: 0.9 INJECTION, SOLUTION INTRAVENOUS at 08:05

## 2021-05-31 ENCOUNTER — OFFICE VISIT (OUTPATIENT)
Dept: OBSTETRICS AND GYNECOLOGY | Facility: CLINIC | Age: 31
End: 2021-05-31
Payer: COMMERCIAL

## 2021-05-31 ENCOUNTER — LAB VISIT (OUTPATIENT)
Dept: LAB | Facility: HOSPITAL | Age: 31
End: 2021-05-31
Attending: OBSTETRICS & GYNECOLOGY
Payer: COMMERCIAL

## 2021-05-31 VITALS
HEIGHT: 67 IN | DIASTOLIC BLOOD PRESSURE: 60 MMHG | BODY MASS INDEX: 37.15 KG/M2 | WEIGHT: 236.69 LBS | SYSTOLIC BLOOD PRESSURE: 110 MMHG

## 2021-05-31 DIAGNOSIS — Z12.4 CERVICAL CANCER SCREENING: ICD-10-CM

## 2021-05-31 DIAGNOSIS — N91.4 SECONDARY OLIGOMENORRHEA: ICD-10-CM

## 2021-05-31 DIAGNOSIS — Z32.01 POSITIVE PREGNANCY TEST: ICD-10-CM

## 2021-05-31 DIAGNOSIS — Z01.419 WELL WOMAN EXAM WITH ROUTINE GYNECOLOGICAL EXAM: Primary | ICD-10-CM

## 2021-05-31 LAB
B-HCG UR QL: POSITIVE
CTP QC/QA: YES
HCG INTACT+B SERPL-ACNC: NORMAL MIU/ML

## 2021-05-31 PROCEDURE — 99395 PREV VISIT EST AGE 18-39: CPT | Mod: 25,S$GLB,, | Performed by: OBSTETRICS & GYNECOLOGY

## 2021-05-31 PROCEDURE — 3008F BODY MASS INDEX DOCD: CPT | Mod: CPTII,S$GLB,, | Performed by: OBSTETRICS & GYNECOLOGY

## 2021-05-31 PROCEDURE — 99395 PR PREVENTIVE VISIT,EST,18-39: ICD-10-PCS | Mod: 25,S$GLB,, | Performed by: OBSTETRICS & GYNECOLOGY

## 2021-05-31 PROCEDURE — 84702 CHORIONIC GONADOTROPIN TEST: CPT | Performed by: OBSTETRICS & GYNECOLOGY

## 2021-05-31 PROCEDURE — 36415 COLL VENOUS BLD VENIPUNCTURE: CPT | Performed by: OBSTETRICS & GYNECOLOGY

## 2021-05-31 PROCEDURE — 88142 CYTOPATH C/V THIN LAYER: CPT | Performed by: OBSTETRICS & GYNECOLOGY

## 2021-05-31 PROCEDURE — 81025 URINE PREGNANCY TEST: CPT | Mod: S$GLB,,, | Performed by: OBSTETRICS & GYNECOLOGY

## 2021-05-31 PROCEDURE — 1126F AMNT PAIN NOTED NONE PRSNT: CPT | Mod: S$GLB,,, | Performed by: OBSTETRICS & GYNECOLOGY

## 2021-05-31 PROCEDURE — 99999 PR PBB SHADOW E&M-EST. PATIENT-LVL III: ICD-10-PCS | Mod: PBBFAC,,, | Performed by: OBSTETRICS & GYNECOLOGY

## 2021-05-31 PROCEDURE — 87624 HPV HI-RISK TYP POOLED RSLT: CPT | Performed by: OBSTETRICS & GYNECOLOGY

## 2021-05-31 PROCEDURE — 81025 POCT URINE PREGNANCY: ICD-10-PCS | Mod: S$GLB,,, | Performed by: OBSTETRICS & GYNECOLOGY

## 2021-05-31 PROCEDURE — 3008F PR BODY MASS INDEX (BMI) DOCUMENTED: ICD-10-PCS | Mod: CPTII,S$GLB,, | Performed by: OBSTETRICS & GYNECOLOGY

## 2021-05-31 PROCEDURE — 1126F PR PAIN SEVERITY QUANTIFIED, NO PAIN PRESENT: ICD-10-PCS | Mod: S$GLB,,, | Performed by: OBSTETRICS & GYNECOLOGY

## 2021-05-31 PROCEDURE — 99999 PR PBB SHADOW E&M-EST. PATIENT-LVL III: CPT | Mod: PBBFAC,,, | Performed by: OBSTETRICS & GYNECOLOGY

## 2021-06-02 ENCOUNTER — LAB VISIT (OUTPATIENT)
Dept: LAB | Facility: HOSPITAL | Age: 31
End: 2021-06-02
Attending: OBSTETRICS & GYNECOLOGY
Payer: COMMERCIAL

## 2021-06-02 DIAGNOSIS — Z32.01 POSITIVE PREGNANCY TEST: ICD-10-CM

## 2021-06-02 LAB
FINAL PATHOLOGIC DIAGNOSIS: NORMAL
HCG INTACT+B SERPL-ACNC: NORMAL MIU/ML
Lab: NORMAL

## 2021-06-02 PROCEDURE — 84702 CHORIONIC GONADOTROPIN TEST: CPT | Performed by: OBSTETRICS & GYNECOLOGY

## 2021-06-02 PROCEDURE — 36415 COLL VENOUS BLD VENIPUNCTURE: CPT | Performed by: OBSTETRICS & GYNECOLOGY

## 2021-06-04 ENCOUNTER — LAB VISIT (OUTPATIENT)
Dept: LAB | Facility: HOSPITAL | Age: 31
End: 2021-06-04
Attending: OBSTETRICS & GYNECOLOGY
Payer: COMMERCIAL

## 2021-06-04 DIAGNOSIS — Z32.01 POSITIVE PREGNANCY TEST: ICD-10-CM

## 2021-06-04 LAB — HCG INTACT+B SERPL-ACNC: NORMAL MIU/ML

## 2021-06-04 PROCEDURE — 84702 CHORIONIC GONADOTROPIN TEST: CPT | Performed by: OBSTETRICS & GYNECOLOGY

## 2021-06-04 PROCEDURE — 36415 COLL VENOUS BLD VENIPUNCTURE: CPT | Performed by: OBSTETRICS & GYNECOLOGY

## 2021-06-07 LAB
HPV HR 12 DNA SPEC QL NAA+PROBE: NEGATIVE
HPV16 AG SPEC QL: NEGATIVE
HPV18 DNA SPEC QL NAA+PROBE: NEGATIVE

## 2021-06-28 ENCOUNTER — INITIAL PRENATAL (OUTPATIENT)
Dept: OBSTETRICS AND GYNECOLOGY | Facility: CLINIC | Age: 31
End: 2021-06-28
Payer: COMMERCIAL

## 2021-06-28 ENCOUNTER — LAB VISIT (OUTPATIENT)
Dept: LAB | Facility: HOSPITAL | Age: 31
End: 2021-06-28
Attending: OBSTETRICS & GYNECOLOGY
Payer: COMMERCIAL

## 2021-06-28 VITALS
DIASTOLIC BLOOD PRESSURE: 62 MMHG | WEIGHT: 238.19 LBS | BODY MASS INDEX: 37.31 KG/M2 | SYSTOLIC BLOOD PRESSURE: 120 MMHG | HEART RATE: 64 BPM

## 2021-06-28 DIAGNOSIS — Z3A.10 10 WEEKS GESTATION OF PREGNANCY: Primary | ICD-10-CM

## 2021-06-28 DIAGNOSIS — Z3A.10 10 WEEKS GESTATION OF PREGNANCY: ICD-10-CM

## 2021-06-28 LAB
ALBUMIN SERPL BCP-MCNC: 3.6 G/DL (ref 3.5–5.2)
ALP SERPL-CCNC: 63 U/L (ref 55–135)
ALT SERPL W/O P-5'-P-CCNC: 13 U/L (ref 10–44)
ANION GAP SERPL CALC-SCNC: 2 MMOL/L (ref 8–16)
AST SERPL-CCNC: 16 U/L (ref 10–40)
BILIRUB SERPL-MCNC: 0.3 MG/DL (ref 0.1–1)
BUN SERPL-MCNC: 8 MG/DL (ref 6–20)
CALCIUM SERPL-MCNC: 9.3 MG/DL (ref 8.7–10.5)
CHLORIDE SERPL-SCNC: 107 MMOL/L (ref 95–110)
CO2 SERPL-SCNC: 26 MMOL/L (ref 23–29)
CREAT SERPL-MCNC: 0.7 MG/DL (ref 0.5–1.4)
EST. GFR  (AFRICAN AMERICAN): >60 ML/MIN/1.73 M^2
EST. GFR  (NON AFRICAN AMERICAN): >60 ML/MIN/1.73 M^2
ESTIMATED AVG GLUCOSE: 100 MG/DL (ref 68–131)
GLUCOSE SERPL-MCNC: 99 MG/DL (ref 70–110)
HBA1C MFR BLD: 5.1 % (ref 4–5.6)
POTASSIUM SERPL-SCNC: 3.8 MMOL/L (ref 3.5–5.1)
PROT SERPL-MCNC: 7.4 G/DL (ref 6–8.4)
SODIUM SERPL-SCNC: 135 MMOL/L (ref 136–145)

## 2021-06-28 PROCEDURE — 83036 HEMOGLOBIN GLYCOSYLATED A1C: CPT | Performed by: OBSTETRICS & GYNECOLOGY

## 2021-06-28 PROCEDURE — 0500F PR INITIAL PRENATAL CARE VISIT: ICD-10-PCS | Mod: S$GLB,,, | Performed by: OBSTETRICS & GYNECOLOGY

## 2021-06-28 PROCEDURE — 86592 SYPHILIS TEST NON-TREP QUAL: CPT | Performed by: OBSTETRICS & GYNECOLOGY

## 2021-06-28 PROCEDURE — 99999 PR PBB SHADOW E&M-EST. PATIENT-LVL III: CPT | Mod: PBBFAC,,, | Performed by: OBSTETRICS & GYNECOLOGY

## 2021-06-28 PROCEDURE — 36415 COLL VENOUS BLD VENIPUNCTURE: CPT | Performed by: OBSTETRICS & GYNECOLOGY

## 2021-06-28 PROCEDURE — 86762 RUBELLA ANTIBODY: CPT | Performed by: OBSTETRICS & GYNECOLOGY

## 2021-06-28 PROCEDURE — 87491 CHLMYD TRACH DNA AMP PROBE: CPT | Performed by: OBSTETRICS & GYNECOLOGY

## 2021-06-28 PROCEDURE — 86803 HEPATITIS C AB TEST: CPT | Performed by: OBSTETRICS & GYNECOLOGY

## 2021-06-28 PROCEDURE — 86703 HIV-1/HIV-2 1 RESULT ANTBDY: CPT | Performed by: OBSTETRICS & GYNECOLOGY

## 2021-06-28 PROCEDURE — 80053 COMPREHEN METABOLIC PANEL: CPT | Performed by: OBSTETRICS & GYNECOLOGY

## 2021-06-28 PROCEDURE — 99999 PR PBB SHADOW E&M-EST. PATIENT-LVL III: ICD-10-PCS | Mod: PBBFAC,,, | Performed by: OBSTETRICS & GYNECOLOGY

## 2021-06-28 PROCEDURE — 87340 HEPATITIS B SURFACE AG IA: CPT | Performed by: OBSTETRICS & GYNECOLOGY

## 2021-06-28 PROCEDURE — 87086 URINE CULTURE/COLONY COUNT: CPT | Performed by: OBSTETRICS & GYNECOLOGY

## 2021-06-28 PROCEDURE — 83021 HEMOGLOBIN CHROMOTOGRAPHY: CPT | Performed by: OBSTETRICS & GYNECOLOGY

## 2021-06-28 PROCEDURE — 0500F INITIAL PRENATAL CARE VISIT: CPT | Mod: S$GLB,,, | Performed by: OBSTETRICS & GYNECOLOGY

## 2021-06-28 PROCEDURE — 87591 N.GONORRHOEAE DNA AMP PROB: CPT | Performed by: OBSTETRICS & GYNECOLOGY

## 2021-06-28 RX ORDER — PNV,CALCIUM 72/IRON/FOLIC ACID 27 MG-1 MG
1 TABLET ORAL DAILY
COMMUNITY
Start: 2021-05-31 | End: 2023-12-04

## 2021-06-29 ENCOUNTER — TELEPHONE (OUTPATIENT)
Dept: MATERNAL FETAL MEDICINE | Facility: CLINIC | Age: 31
End: 2021-06-29

## 2021-06-29 LAB
HBV SURFACE AG SERPL QL IA: NEGATIVE
HCV AB SERPL QL IA: NEGATIVE
HIV 1+2 AB+HIV1 P24 AG SERPL QL IA: NEGATIVE
RPR SER QL: NORMAL
RUBV IGG SER-ACNC: 12.9 IU/ML
RUBV IGG SER-IMP: REACTIVE

## 2021-06-30 LAB
BACTERIA UR CULT: NORMAL
C TRACH DNA SPEC QL NAA+PROBE: NOT DETECTED
HGB A2 MFR BLD HPLC: 1.5 % (ref 2.2–3.2)
HGB FRACT BLD ELPH-IMP: ABNORMAL
HGB FRACT BLD ELPH-IMP: NORMAL
N GONORRHOEA DNA SPEC QL NAA+PROBE: NOT DETECTED

## 2021-07-07 ENCOUNTER — PATIENT MESSAGE (OUTPATIENT)
Dept: ADMINISTRATIVE | Facility: OTHER | Age: 31
End: 2021-07-07

## 2021-07-14 ENCOUNTER — PATIENT MESSAGE (OUTPATIENT)
Dept: ADMINISTRATIVE | Facility: OTHER | Age: 31
End: 2021-07-14

## 2021-07-19 ENCOUNTER — TELEPHONE (OUTPATIENT)
Dept: MATERNAL FETAL MEDICINE | Facility: CLINIC | Age: 31
End: 2021-07-19

## 2022-09-19 ENCOUNTER — TELEPHONE (OUTPATIENT)
Dept: NEUROLOGY | Facility: CLINIC | Age: 32
End: 2022-09-19
Payer: COMMERCIAL

## 2022-09-19 NOTE — TELEPHONE ENCOUNTER
----- Message from Trish [place] sent at 9/19/2022 10:17 AM CDT -----  .Type: Patient Call Back    Who called: April with [facility]     What is the request in detail: States they need auth for MRI that was ordered     Can the clinic reply by [facility]?    Would the patient rather a call back or a response via [facility]?  Call     Best call back number: [phone]       Spoke to April, she needs the MRI from 2/2021 approved thru [employer]. I explained that I do not do auths and will send a message to our pre cert dept

## 2022-10-15 ENCOUNTER — OFFICE VISIT (OUTPATIENT)
Dept: URGENT CARE | Facility: CLINIC | Age: 32
End: 2022-10-15
Payer: COMMERCIAL

## 2022-10-15 VITALS
OXYGEN SATURATION: 98 % | HEIGHT: 67 IN | BODY MASS INDEX: 36.1 KG/M2 | SYSTOLIC BLOOD PRESSURE: 114 MMHG | TEMPERATURE: 97 F | DIASTOLIC BLOOD PRESSURE: 73 MMHG | RESPIRATION RATE: 18 BRPM | WEIGHT: 230 LBS | HEART RATE: 61 BPM

## 2022-10-15 DIAGNOSIS — N89.8 VAGINAL DISCHARGE: ICD-10-CM

## 2022-10-15 DIAGNOSIS — N30.00 ACUTE CYSTITIS WITHOUT HEMATURIA: Primary | ICD-10-CM

## 2022-10-15 DIAGNOSIS — R30.0 BURNING WITH URINATION: ICD-10-CM

## 2022-10-15 LAB
BILIRUB UR QL STRIP: NEGATIVE
GLUCOSE UR QL STRIP: NEGATIVE
KETONES UR QL STRIP: NEGATIVE
LEUKOCYTE ESTERASE UR QL STRIP: NEGATIVE
PH, POC UA: 5.5 (ref 5–8)
POC BLOOD, URINE: POSITIVE
POC NITRATES, URINE: NEGATIVE
PROT UR QL STRIP: NEGATIVE
SP GR UR STRIP: 1.02 (ref 1–1.03)
UROBILINOGEN UR STRIP-ACNC: NORMAL (ref 0.1–1.1)

## 2022-10-15 PROCEDURE — 99213 OFFICE O/P EST LOW 20 MIN: CPT | Mod: S$GLB,,, | Performed by: NURSE PRACTITIONER

## 2022-10-15 PROCEDURE — 99213 PR OFFICE/OUTPT VISIT, EST, LEVL III, 20-29 MIN: ICD-10-PCS | Mod: S$GLB,,, | Performed by: NURSE PRACTITIONER

## 2022-10-15 PROCEDURE — 3074F SYST BP LT 130 MM HG: CPT | Mod: CPTII,S$GLB,, | Performed by: NURSE PRACTITIONER

## 2022-10-15 PROCEDURE — 1160F RVW MEDS BY RX/DR IN RCRD: CPT | Mod: CPTII,S$GLB,, | Performed by: NURSE PRACTITIONER

## 2022-10-15 PROCEDURE — 81003 POCT URINALYSIS, DIPSTICK, AUTOMATED, W/O SCOPE: ICD-10-PCS | Mod: QW,S$GLB,, | Performed by: NURSE PRACTITIONER

## 2022-10-15 PROCEDURE — 3044F PR MOST RECENT HEMOGLOBIN A1C LEVEL <7.0%: ICD-10-PCS | Mod: CPTII,S$GLB,, | Performed by: NURSE PRACTITIONER

## 2022-10-15 PROCEDURE — 81003 URINALYSIS AUTO W/O SCOPE: CPT | Mod: QW,S$GLB,, | Performed by: NURSE PRACTITIONER

## 2022-10-15 PROCEDURE — 1159F PR MEDICATION LIST DOCUMENTED IN MEDICAL RECORD: ICD-10-PCS | Mod: CPTII,S$GLB,, | Performed by: NURSE PRACTITIONER

## 2022-10-15 PROCEDURE — 1159F MED LIST DOCD IN RCRD: CPT | Mod: CPTII,S$GLB,, | Performed by: NURSE PRACTITIONER

## 2022-10-15 PROCEDURE — 3078F DIAST BP <80 MM HG: CPT | Mod: CPTII,S$GLB,, | Performed by: NURSE PRACTITIONER

## 2022-10-15 PROCEDURE — 3078F PR MOST RECENT DIASTOLIC BLOOD PRESSURE < 80 MM HG: ICD-10-PCS | Mod: CPTII,S$GLB,, | Performed by: NURSE PRACTITIONER

## 2022-10-15 PROCEDURE — 3074F PR MOST RECENT SYSTOLIC BLOOD PRESSURE < 130 MM HG: ICD-10-PCS | Mod: CPTII,S$GLB,, | Performed by: NURSE PRACTITIONER

## 2022-10-15 PROCEDURE — 3044F HG A1C LEVEL LT 7.0%: CPT | Mod: CPTII,S$GLB,, | Performed by: NURSE PRACTITIONER

## 2022-10-15 PROCEDURE — 87086 URINE CULTURE/COLONY COUNT: CPT | Performed by: NURSE PRACTITIONER

## 2022-10-15 PROCEDURE — 1160F PR REVIEW ALL MEDS BY PRESCRIBER/CLIN PHARMACIST DOCUMENTED: ICD-10-PCS | Mod: CPTII,S$GLB,, | Performed by: NURSE PRACTITIONER

## 2022-10-15 PROCEDURE — 81514 NFCT DS BV&VAGINITIS DNA ALG: CPT | Performed by: NURSE PRACTITIONER

## 2022-10-15 RX ORDER — FLUCONAZOLE 150 MG/1
150 TABLET ORAL DAILY
Qty: 1 TABLET | Refills: 0 | Status: SHIPPED | OUTPATIENT
Start: 2022-10-15 | End: 2022-10-16

## 2022-10-15 RX ORDER — PHENAZOPYRIDINE HYDROCHLORIDE 200 MG/1
200 TABLET, FILM COATED ORAL 3 TIMES DAILY PRN
Qty: 6 TABLET | Refills: 0 | Status: SHIPPED | OUTPATIENT
Start: 2022-10-15 | End: 2022-10-17

## 2022-10-15 RX ORDER — NITROFURANTOIN 25; 75 MG/1; MG/1
100 CAPSULE ORAL 2 TIMES DAILY
Qty: 14 CAPSULE | Refills: 0 | Status: SHIPPED | OUTPATIENT
Start: 2022-10-15 | End: 2022-10-22

## 2022-10-15 NOTE — PATIENT INSTRUCTIONS
If your condition worsens or fails to improve, we recommend that you receive another evaluation at the ER immediately, contact your PCP to discuss your concerns, or return here.  You must understand that you've received an urgent care treatment only, and that you may be released before all your medical problems are known or treated.  You, the patient, will arrange for follow-up care as instructed.     If you had cultures done, it will take 3-5 business days to result.  We will call you with the result.     If you are are female and on birth control pills, use additional methods to prevent pregnancy while on antibiotics and for one cycle after.     Cranberry juice may help.  Get the 100% cranberry juice, mix 4 oz. of juice with 4 oz. of water, and drink this 8 oz. glass of liquid once a day.  Increase water intake to at least 8-10 glasses/day.    Do not wear contacts with Pyridium as it will stain them.  You may want to wear a panty liner with Pyridium as it may stain your underwear.    Avoid caffeine, alcohol, or spicy foods as they irritate the bladder.

## 2022-10-15 NOTE — LETTER
October 15, 2022      [facility]gent [facility] [address] [address]  Phone: [phone]  Fax: [phone]       Patient: Lilliana Sterling   YOB: 1990  Date of Visit: 10/15/2022    To Whom It May Concern:    Emilie Sterling  was at [facility] on 10/15/2022. The patient may return to work/school on 10/16/22. If you have any questions or concerns, or if I can be of further assistance, please do not hesitate to contact me.    Sincerely,    Love Rodriguez MA

## 2022-10-15 NOTE — PROGRESS NOTES
"Subjective:       Patient ID: Lilliana Sterling is a 31 y.o. female.    Vitals:  height is 5' 7" (1.702 m) and weight is 104.3 kg (230 lb). Her temperature is 97.4 °F (36.3 °C). Her blood pressure is 114/73 and her pulse is 61. Her respiration is 18 and oxygen saturation is 98%.     Chief Complaint: Urinary Tract Infection (Entered by patient)    30yo female reports burning with urination for two days, reports worsening of symptoms this morning.  Reports generalized pain with sitting as well.  Denies odor or appearance changes to urine.  Reports small amount of whitened vaginal discharge as well, denies vaginal lesions.  Denies recent sexual activity.  Denies abd/back/flank pain.  Denies fever/chills, denies n/v/d.    Urinary Tract Infection   This is a new problem. The current episode started yesterday. The problem has been unchanged. The quality of the pain is described as burning. The pain is at a severity of 8/10. Associated symptoms include a discharge. Pertinent negatives include no chills, flank pain, frequency, nausea, urgency or vomiting. She has tried nothing for the symptoms.     Constitution: Negative for chills and fever.   Gastrointestinal:  Negative for nausea, vomiting and diarrhea.   Genitourinary:  Positive for dysuria and vaginal discharge. Negative for frequency, urgency, flank pain, vaginal pain, vaginal bleeding, vaginal odor, painful ejaculation, genital sore, painful ejaculation and pelvic pain.     Objective:      Physical Exam   Constitutional: She is oriented to person, place, and time. She appears well-developed.   HENT:   Head: Normocephalic and atraumatic.   Ears:   Right Ear: External ear normal.   Left Ear: External ear normal.   Nose: Nose normal. No nasal deformity. No epistaxis.   Mouth/Throat: Oropharynx is clear and moist and mucous membranes are normal.   Eyes: Conjunctivae and lids are normal.   Neck: Trachea normal and phonation normal. Neck supple.   Cardiovascular: Normal " rate, regular rhythm, normal heart sounds and normal pulses.   Pulmonary/Chest: Effort normal and breath sounds normal. No respiratory distress.   Abdominal: Normal appearance and bowel sounds are normal. She exhibits no distension and no mass. Soft. There is no abdominal tenderness. There is no rebound, no guarding, no tenderness at McBurney's point, negative Everett's sign, no left CVA tenderness, negative Rovsing's sign, negative psoas sign, no right CVA tenderness and negative obturator sign.   Genitourinary:         Comments: Exam deferred per pt.     Musculoskeletal: Normal range of motion.         General: Normal range of motion.   Neurological: She is alert and oriented to person, place, and time. She has normal strength.   Skin: Skin is warm, dry, intact, not diaphoretic and not pale.   Psychiatric: Her speech is normal and behavior is normal. Judgment and thought content normal.   Nursing note and vitals reviewed.    Results for orders placed or performed in visit on 10/15/22   POCT Urinalysis, Dipstick, Automated, W/O Scope   Result Value Ref Range    POC Blood, Urine Positive (A) Negative    POC Bilirubin, Urine Negative Negative    POC Urobilinogen, Urine normal 0.1 - 1.1    POC Ketones, Urine Negative Negative    POC Protein, Urine Negative Negative    POC Nitrates, Urine Negative Negative    POC Glucose, Urine Negative Negative    pH, UA 5.5 5 - 8    POC Specific Gravity, Urine 1.020 1.003 - 1.029    POC Leukocytes, Urine Negative Negative           Assessment:       1. Acute cystitis without hematuria    2. Burning with urination    3. Vaginal discharge          Plan:       Provided education on prescribed medications, recommended increasing fluid intake and NSAIDs for additional pain relief.  Will call with urine culture results.  Provided education on return/ER precautions.  Pt verbalized understanding and agreed to plan.      Acute cystitis without hematuria  -     nitrofurantoin,  macrocrystal-monohydrate, (MACROBID) 100 MG capsule; Take 1 capsule (100 mg total) by mouth 2 (two) times daily. for 7 days  Dispense: 14 capsule; Refill: 0  -     phenazopyridine (PYRIDIUM) 200 MG tablet; Take 1 tablet (200 mg total) by mouth 3 (three) times daily as needed for Pain.  Dispense: 6 tablet; Refill: 0    Burning with urination  -     POCT Urinalysis, Dipstick, Automated, W/O Scope  -     Urine culture    Vaginal discharge  -     VAGINOSIS SCREEN BY DNA PROBE  -     fluconazole (DIFLUCAN) 150 MG Tab; Take 1 tablet (150 mg total) by mouth once daily. for 1 day  Dispense: 1 tablet; Refill: 0       Patient Instructions   If your condition worsens or fails to improve, we recommend that you receive another evaluation at the ER immediately, contact your PCP to discuss your concerns, or return here.  You must understand that you've received an urgent care treatment only, and that you may be released before all your medical problems are known or treated.  You, the patient, will arrange for follow-up care as instructed.     If you had cultures done, it will take 3-5 business days to result.  We will call you with the result.     If you are are female and on birth control pills, use additional methods to prevent pregnancy while on antibiotics and for one cycle after.     Cranberry juice may help.  Get the 100% cranberry juice, mix 4 oz. of juice with 4 oz. of water, and drink this 8 oz. glass of liquid once a day.  Increase water intake to at least 8-10 glasses/day.    Do not wear contacts with Pyridium as it will stain them.  You may want to wear a panty liner with Pyridium as it may stain your underwear.    Avoid caffeine, alcohol, or spicy foods as they irritate the bladder.

## 2022-10-17 ENCOUNTER — TELEPHONE (OUTPATIENT)
Dept: URGENT CARE | Facility: CLINIC | Age: 32
End: 2022-10-17
Payer: COMMERCIAL

## 2022-10-17 LAB
BACTERIA UR CULT: NORMAL
BACTERIA UR CULT: NORMAL
BACTERIAL VAGINOSIS DNA: NEGATIVE
CANDIDA GLABRATA DNA: NEGATIVE
CANDIDA KRUSEI DNA: NEGATIVE
CANDIDA RRNA VAG QL PROBE: POSITIVE
T VAGINALIS RRNA GENITAL QL PROBE: NEGATIVE

## 2022-10-17 NOTE — TELEPHONE ENCOUNTER
Pt notified her urine showed some bacteria but not an overwhelming urinary tract infection. She is feeling some better but still has some vaginal irritation. We are still wating on the vaginosis screen and will be back in touch with those results

## 2022-10-18 ENCOUNTER — TELEPHONE (OUTPATIENT)
Dept: URGENT CARE | Facility: CLINIC | Age: 32
End: 2022-10-18
Payer: COMMERCIAL

## 2022-10-18 DIAGNOSIS — B37.9 CANDIDA INFECTION: Primary | ICD-10-CM

## 2022-10-18 RX ORDER — FLUCONAZOLE 150 MG/1
150 TABLET ORAL DAILY
Qty: 1 TABLET | Refills: 0 | Status: SHIPPED | OUTPATIENT
Start: 2022-10-18 | End: 2022-10-19

## 2022-10-18 NOTE — TELEPHONE ENCOUNTER
Patient called and notified that she has a candida infection. Will send diflucan for symptoms, pt advised to finish out the antibiotics previously prescribed has no concerns at this time.

## 2023-12-04 ENCOUNTER — OFFICE VISIT (OUTPATIENT)
Dept: PULMONOLOGY | Facility: CLINIC | Age: 33
End: 2023-12-04
Payer: COMMERCIAL

## 2023-12-04 VITALS
WEIGHT: 257.5 LBS | HEIGHT: 67 IN | SYSTOLIC BLOOD PRESSURE: 130 MMHG | OXYGEN SATURATION: 96 % | DIASTOLIC BLOOD PRESSURE: 74 MMHG | BODY MASS INDEX: 40.42 KG/M2 | HEART RATE: 72 BPM

## 2023-12-04 DIAGNOSIS — J45.40 ASTHMA IN ADULT, MODERATE PERSISTENT, UNCOMPLICATED: Primary | ICD-10-CM

## 2023-12-04 DIAGNOSIS — R06.09 DOE (DYSPNEA ON EXERTION): ICD-10-CM

## 2023-12-04 PROCEDURE — 1159F PR MEDICATION LIST DOCUMENTED IN MEDICAL RECORD: ICD-10-PCS | Mod: CPTII,S$GLB,, | Performed by: INTERNAL MEDICINE

## 2023-12-04 PROCEDURE — 99999 PR PBB SHADOW E&M-EST. PATIENT-LVL III: ICD-10-PCS | Mod: PBBFAC,,, | Performed by: INTERNAL MEDICINE

## 2023-12-04 PROCEDURE — 3008F PR BODY MASS INDEX (BMI) DOCUMENTED: ICD-10-PCS | Mod: CPTII,S$GLB,, | Performed by: INTERNAL MEDICINE

## 2023-12-04 PROCEDURE — 3075F SYST BP GE 130 - 139MM HG: CPT | Mod: CPTII,S$GLB,, | Performed by: INTERNAL MEDICINE

## 2023-12-04 PROCEDURE — 3008F BODY MASS INDEX DOCD: CPT | Mod: CPTII,S$GLB,, | Performed by: INTERNAL MEDICINE

## 2023-12-04 PROCEDURE — 3078F DIAST BP <80 MM HG: CPT | Mod: CPTII,S$GLB,, | Performed by: INTERNAL MEDICINE

## 2023-12-04 PROCEDURE — 3078F PR MOST RECENT DIASTOLIC BLOOD PRESSURE < 80 MM HG: ICD-10-PCS | Mod: CPTII,S$GLB,, | Performed by: INTERNAL MEDICINE

## 2023-12-04 PROCEDURE — 99999 PR PBB SHADOW E&M-EST. PATIENT-LVL III: CPT | Mod: PBBFAC,,, | Performed by: INTERNAL MEDICINE

## 2023-12-04 PROCEDURE — 99204 OFFICE O/P NEW MOD 45 MIN: CPT | Mod: S$GLB,,, | Performed by: INTERNAL MEDICINE

## 2023-12-04 PROCEDURE — 3075F PR MOST RECENT SYSTOLIC BLOOD PRESS GE 130-139MM HG: ICD-10-PCS | Mod: CPTII,S$GLB,, | Performed by: INTERNAL MEDICINE

## 2023-12-04 PROCEDURE — 99204 PR OFFICE/OUTPT VISIT, NEW, LEVL IV, 45-59 MIN: ICD-10-PCS | Mod: S$GLB,,, | Performed by: INTERNAL MEDICINE

## 2023-12-04 PROCEDURE — 1159F MED LIST DOCD IN RCRD: CPT | Mod: CPTII,S$GLB,, | Performed by: INTERNAL MEDICINE

## 2023-12-04 RX ORDER — MONTELUKAST SODIUM 10 MG/1
10 TABLET ORAL NIGHTLY
Qty: 30 TABLET | Refills: 11 | Status: SHIPPED | OUTPATIENT
Start: 2023-12-04 | End: 2024-11-28

## 2023-12-04 RX ORDER — PREDNISONE 20 MG/1
20 TABLET ORAL DAILY
Qty: 7 TABLET | Refills: 0 | Status: SHIPPED | OUTPATIENT
Start: 2023-12-04

## 2023-12-04 RX ORDER — ALBUTEROL SULFATE 90 UG/1
2 AEROSOL, METERED RESPIRATORY (INHALATION) EVERY 6 HOURS PRN
Qty: 18 G | Refills: 11 | Status: SHIPPED | OUTPATIENT
Start: 2023-12-04

## 2023-12-04 RX ORDER — FLUTICASONE PROPIONATE AND SALMETEROL 250; 50 UG/1; UG/1
1 POWDER RESPIRATORY (INHALATION) 2 TIMES DAILY
Qty: 60 EACH | Refills: 11 | Status: SHIPPED | OUTPATIENT
Start: 2023-12-04 | End: 2024-12-03

## 2023-12-04 RX ORDER — PREDNISONE 20 MG/1
20 TABLET ORAL DAILY
Qty: 7 TABLET | Refills: 0 | Status: SHIPPED | OUTPATIENT
Start: 2023-12-04 | End: 2023-12-04 | Stop reason: SDUPTHER

## 2023-12-04 NOTE — PROGRESS NOTES
History and Physical  [facility]      SUBJECTIVE:     Chief Complaint:   Asthma    History of Present Illness/Interval History:  She is a 33 year-old female that is here for evaluation of shortness of breath for the last two weeks. She has a history of childhood & adult asthma. She has been wheezing and coughing in between talking for the last two weeks. She denies cough. She denies fevers, unintentional weight loss, chills, or night sweats. No nasal congestion or post nasal drip. She does not take medications for allergies.     Her PCP prescribed albuterol inhaler. She is out of the medication and her symptoms are getting worse. She has only ever taking the albuterol inhaler and nebulizer treatments in the past. She is not on any controller therapy.    She is from the [facility] [place]. She moved to [place] 5 years ago.     Additional Pulmonary History:  Childhood Illnesses:  Asthma   Occupational:  [employer]. .   Environmental: Pollen, smoke, dust  Tobacco/Smoking: Lifetime non-smoker     Review of patient's allergies indicates:   Allergen Reactions    Pcn [penicillins] Hives       Past Medical History:   Diagnosis Date    Asthma      Past Surgical History:   Procedure Laterality Date     SECTION      ESOPHAGOGASTRODUODENOSCOPY N/A 2020    Procedure: EGD (ESOPHAGOGASTRODUODENOSCOPY);  Surgeon: Sandor Johnson MD;  Location: [facility];  Service: Endoscopy;  Laterality: N/A;     Family History   Problem Relation Age of Onset    Diabetes Paternal Grandmother      Social History     Socioeconomic History    Marital status:    Tobacco Use    Smoking status: Never    Smokeless tobacco: Never   Substance and Sexual Activity    Alcohol use: No    Drug use: No    Sexual activity: Yes     Partners: Male     Birth control/protection: None     Review of Systems:  Constitutional:  Negative for activity change, appetite change, chills, diaphoresis, fatigue, fever  "and unexpected weight change.   HENT:  Negative for nasal congestion, sinus pressure/congestion and sneezing.    Eyes:  Negative for discharge and itching.   Respiratory:  Negative for cough, choking, chest tightness, shortness of breath, wheezing and stridor.    Cardiovascular:  Negative for palpitations, leg swelling and claudication.   Gastrointestinal:  Negative for abdominal distention, nausea, vomiting and reflux.   Allergic/Immunologic: Negative for environmental allergies, food allergies and immunocompromised state.   Neurological:  Negative for dizziness, light-headedness and headaches.       OBJECTIVE:     Vital Signs  Vitals:    12/04/23 1419   BP: 130/74   Pulse: 72   SpO2: 96%   Weight: 116.8 kg (257 lb 8 oz)   Height: 5' 7" (1.702 m)     Body mass index is 40.33 kg/m².    Physical Exam:  Constitutional:       Normal appearance, appears in no acute distress.Mental status alert and oriented.  HENT:      Head: Normocephalic and atraumatic.      Eyelid: No eyelid discharge. Pupils PERRLA.     Nose: No congestion or rhinorrhea.   Cardiovascular:      Rate and Rhythm: Normal rate and regular rhythm.      Pulses: Normal pulses.      Heart sounds: Normal heart sounds. No murmur heard.  Pulmonary:      Effort: Pulmonary effort is normal. No respiratory distress.      Breath sounds: Normal breath sounds. No stridor. No wheezing, rhonchi or rales.     Chest wall: No tenderness.   Abdominal:      General: Abdomen is flat.      Palpations: Abdomen is soft.     Laboratory:  Lab Results   Component Value Date    WBC 5.3 11/10/2022    HGB 14.0 11/10/2022    HCT 41.3 11/10/2022    MCV 84.8 11/10/2022     02/02/2022       Chest Imaging, My Impression:   CXR is pending        12/4/2023     2:19 PM 10/15/2022    10:25 AM 2/2/2022    11:41 AM 6/28/2021     9:16 AM 5/31/2021    11:27 AM 5/29/2021    10:04 AM 5/29/2021     7:37 AM   Pulmonary Studies Review   SpO2 96 % 98 % 97 %   98 % 100 %   Height 5' 7" (1.702 m) " "5' 7" (1.702 m)   5' 7" (1.702 m)  5' 7" (1.702 m)   Weight 116.8 kg (257 lb 8 oz) 104.3 kg (230 lb) 105.2 kg (232 lb) 108 kg (238 lb 3.3 oz) 107.3 kg (236 lb 10.6 oz)  99.8 kg (220 lb)   BMI (Calculated) 40.3 36   37.1  34.4   Predicted Distance 428.31 466.8 691.44 697.27 465.77  482.61   Predicted Distance Meters (Calculated) 562.13 meters 602.25 meters   601.11 meters  618.42 meters     PFT is pending       ASSESSMENT/PLAN:     1. Asthma in adult, moderate persistent, uncomplicated  Overview:  33 year-old female here with shortness of breath, coughing and wheezing present for the last two weeks. She has a history of asthma. She was using albuterol rescue inhaler but has run out of the medication. No recent illness or fevers.  -Start Advair diskus inhaler for maintenance therapy. Demonstration performed and instructed to rinse mouth after each use.   -Start albuterol inhaler for rescue therapy.   -Obtain Chest X-Ray   -Obtain IgE, CBC labs   -Take prednisone for one week for exacerbation  -Start singuilar daily   -Return to clinic in January. PFTs prior to next visit.   Orders:  -     Complete PFT with bronchodilator; Future  -     X-Ray Chest PA And Lateral; Future  -     fluticasone-salmeterol diskus inhaler 250-50 mcg; Inhale 1 puff into the lungs 2 (two) times daily. Controller  Dispense: 60 each; Refill: 11  -     albuterol (VENTOLIN HFA) 90 mcg/actuation inhaler; Inhale 2 puffs into the lungs every 6 (six) hours as needed for Wheezing. Rescue  Dispense: 18 g; Refill: 11  -     predniSONE (DELTASONE) 20 MG tablet; Take 1 tablet (20 mg total) by mouth once daily.  Dispense: 7 tablet; Refill: 0  -     montelukast (SINGULAIR) 10 mg tablet; Take 1 tablet (10 mg total) by mouth every evening.  Dispense: 30 tablet; Refill: 11  -     IGE; Future; Expected date: 12/04/2023  -     CBC auto differential; Future; Expected date: 12/04/2023    2. MOE (dyspnea on exertion)  -     Complete PFT with bronchodilator  -     " X-Ray Chest PA And Lateral     Total professional time spent for the encounter: 45 minutes  Time was spent preparing to see the patient, reviewing results of prior testing, obtaining and/or reviewing separately obtained history, performing a medically appropriate examination and interview, counseling and educating the patient/family, ordering medications/tests/procedures, referring and communicating with other health care professionals, documenting clinical information in the electronic health record, and independently interpreting results.    Jose Jones MD

## 2023-12-05 ENCOUNTER — HOSPITAL ENCOUNTER (OUTPATIENT)
Dept: RADIOLOGY | Facility: HOSPITAL | Age: 33
Discharge: HOME | End: 2023-12-05
Payer: COMMERCIAL

## 2023-12-05 DIAGNOSIS — J45.40 ASTHMA IN ADULT, MODERATE PERSISTENT, UNCOMPLICATED: ICD-10-CM

## 2023-12-05 DIAGNOSIS — R06.09 DOE (DYSPNEA ON EXERTION): ICD-10-CM

## 2023-12-05 PROCEDURE — 71046 X-RAY EXAM CHEST 2 VIEWS: CPT | Mod: TC,FY

## 2023-12-05 PROCEDURE — 71046 X-RAY EXAM CHEST 2 VIEWS: CPT | Mod: 26,,, | Performed by: RADIOLOGY

## 2023-12-05 PROCEDURE — 71046 XR CHEST PA AND LATERAL: ICD-10-PCS | Mod: 26,,, | Performed by: RADIOLOGY

## 2024-01-08 ENCOUNTER — HOSPITAL ENCOUNTER (OUTPATIENT)
Dept: PULMONOLOGY | Facility: CLINIC | Age: 34
Discharge: HOME | End: 2024-01-08
Payer: COMMERCIAL

## 2024-01-08 ENCOUNTER — OFFICE VISIT (OUTPATIENT)
Dept: PULMONOLOGY | Facility: CLINIC | Age: 34
End: 2024-01-08
Payer: COMMERCIAL

## 2024-01-08 VITALS
BODY MASS INDEX: 40.45 KG/M2 | SYSTOLIC BLOOD PRESSURE: 128 MMHG | HEART RATE: 48 BPM | HEIGHT: 67 IN | DIASTOLIC BLOOD PRESSURE: 76 MMHG | OXYGEN SATURATION: 94 % | WEIGHT: 257.69 LBS

## 2024-01-08 DIAGNOSIS — J45.40 ASTHMA IN ADULT, MODERATE PERSISTENT, UNCOMPLICATED: Primary | ICD-10-CM

## 2024-01-08 DIAGNOSIS — R06.09 DOE (DYSPNEA ON EXERTION): ICD-10-CM

## 2024-01-08 DIAGNOSIS — E66.01 CLASS 3 SEVERE OBESITY WITH BODY MASS INDEX (BMI) OF 40.0 TO 44.9 IN ADULT, UNSPECIFIED OBESITY TYPE, UNSPECIFIED WHETHER SERIOUS COMORBIDITY PRESENT: ICD-10-CM

## 2024-01-08 DIAGNOSIS — J45.40 ASTHMA IN ADULT, MODERATE PERSISTENT, UNCOMPLICATED: ICD-10-CM

## 2024-01-08 PROBLEM — E66.813 CLASS 3 SEVERE OBESITY WITH BODY MASS INDEX (BMI) OF 40.0 TO 44.9 IN ADULT: Status: ACTIVE | Noted: 2024-01-08

## 2024-01-08 PROCEDURE — 94060 EVALUATION OF WHEEZING: CPT | Mod: S$GLB,,, | Performed by: INTERNAL MEDICINE

## 2024-01-08 PROCEDURE — 3078F DIAST BP <80 MM HG: CPT | Mod: CPTII,S$GLB,, | Performed by: INTERNAL MEDICINE

## 2024-01-08 PROCEDURE — 99214 OFFICE O/P EST MOD 30 MIN: CPT | Mod: 25,S$GLB,, | Performed by: INTERNAL MEDICINE

## 2024-01-08 PROCEDURE — 3074F SYST BP LT 130 MM HG: CPT | Mod: CPTII,S$GLB,, | Performed by: INTERNAL MEDICINE

## 2024-01-08 PROCEDURE — 94729 DIFFUSING CAPACITY: CPT | Mod: S$GLB,,, | Performed by: INTERNAL MEDICINE

## 2024-01-08 PROCEDURE — 99999 PR PBB SHADOW E&M-EST. PATIENT-LVL III: CPT | Mod: PBBFAC,,, | Performed by: INTERNAL MEDICINE

## 2024-01-08 PROCEDURE — 3008F BODY MASS INDEX DOCD: CPT | Mod: CPTII,S$GLB,, | Performed by: INTERNAL MEDICINE

## 2024-01-08 PROCEDURE — 1159F MED LIST DOCD IN RCRD: CPT | Mod: CPTII,S$GLB,, | Performed by: INTERNAL MEDICINE

## 2024-01-08 PROCEDURE — 94727 GAS DIL/WSHOT DETER LNG VOL: CPT | Mod: S$GLB,,, | Performed by: INTERNAL MEDICINE

## 2024-01-08 NOTE — PATIENT INSTRUCTIONS
-Continue Advair diskus inhaler for maintenance therapy. Rinse mouth after each use.   -Continue albuterol inhaler for rescue therapy.   -Continue Singulair daily

## 2024-01-08 NOTE — PROGRESS NOTES
Follow Up  [facility]    SUBJECTIVE:     Chief Complaint:   Asthma    History of Present Illness  She is a 33 year-old female that is here for follow up of asthma. She has a history of childhood & adult asthma. When we saw her one month ago, she had been wheezing and coughing for two weeks.     We started her on advair inhaler at that time in addition to prednisone for acute exacerbation. In addition to this, she has been taking singulair, albuterol MDI prn.    Today, she reports that her symptoms are much improved. She feels like her asthma is well controlled today. She is taking the advair one puff daily.    She is from the [place]. She moved to [place] 5 years ago.     Additional Pulmonary History:  Childhood Illnesses:  Asthma   Occupational:  [employer]. .   Environmental: Pollen, smoke, dust  Tobacco/Smoking: Lifetime non-smoker     Review of patient's allergies indicates:   Allergen Reactions    Pcn [penicillins] Hives       Past Medical History:   Diagnosis Date    Asthma      Past Surgical History:   Procedure Laterality Date     SECTION      ESOPHAGOGASTRODUODENOSCOPY N/A 2020    Procedure: EGD (ESOPHAGOGASTRODUODENOSCOPY);  Surgeon: Sandor Johnson MD;  Location: [facility];  Service: Endoscopy;  Laterality: N/A;     Family History   Problem Relation Age of Onset    Diabetes Paternal Grandmother      Social History     Socioeconomic History    Marital status:    Tobacco Use    Smoking status: Never    Smokeless tobacco: Never   Substance and Sexual Activity    Alcohol use: No    Drug use: No    Sexual activity: Yes     Partners: Male     Birth control/protection: None     Review of Systems:  Constitutional:  Negative for activity change, appetite change, chills, diaphoresis, fatigue, fever and unexpected weight change.   HENT:  Negative for nasal congestion, sinus pressure/congestion and sneezing.    Eyes:  Negative for discharge  "and itching.   Respiratory:  Negative for cough, choking, chest tightness, shortness of breath, wheezing and stridor.    Cardiovascular:  Negative for palpitations, leg swelling and claudication.   Gastrointestinal:  Negative for abdominal distention, nausea, vomiting and reflux.   Allergic/Immunologic: Negative for environmental allergies, food allergies and immunocompromised state.   Neurological:  Negative for dizziness, light-headedness and headaches.     OBJECTIVE:     Vital Signs  Vitals:    01/08/24 0952   BP: 128/76   Pulse: (!) 39   SpO2: (!) 92%   Weight: 116.9 kg (257 lb 11.5 oz)   Height: 5' 7" (1.702 m)       Body mass index is 40.36 kg/m².    Physical Exam:  Constitutional:       Normal appearance, appears in no acute distress.Mental status alert and oriented.  HENT:      Head: Normocephalic and atraumatic.      Eyelid: No eyelid discharge. Pupils PERRLA.     Nose: No congestion or rhinorrhea.   Cardiovascular:      Rate and Rhythm: Normal rate and regular rhythm.      Pulses: Normal pulses.      Heart sounds: Normal heart sounds. No murmur heard.  Pulmonary:      Effort: Pulmonary effort is normal. No respiratory distress.      Breath sounds: Normal breath sounds. No stridor. No wheezing, rhonchi or rales.     Chest wall: No tenderness.   Abdominal:      General: Abdomen is flat.      Palpations: Abdomen is soft.     Laboratory:  Lab Results   Component Value Date    WBC 8.52 12/05/2023    HGB 13.7 12/05/2023    HCT 42.8 12/05/2023    MCV 85 12/05/2023     12/05/2023       Chest Imaging, My Impression:   CXR 12/05/2023: lung fields clear         1/8/2024     9:52 AM 12/4/2023     2:19 PM 10/15/2022    10:25 AM 2/2/2022    11:41 AM 6/28/2021     9:16 AM 5/31/2021    11:27 AM 5/29/2021    10:04 AM   Pulmonary Studies Review   SpO2 92 % 96 % 98 % 97 %   98 %   Height 5' 7" (1.702 m) 5' 7" (1.702 m) 5' 7" (1.702 m)   5' 7" (1.702 m)    Weight 116.9 kg (257 lb 11.5 oz) 116.8 kg (257 lb 8 oz) 104.3 " kg (230 lb) 105.2 kg (232 lb) 108 kg (238 lb 3.3 oz) 107.3 kg (236 lb 10.6 oz)    BMI (Calculated) 40.4 40.3 36   37.1    Predicted Distance 427.68 428.31 466.8 691.44 697.27 465.77    Predicted Distance Meters (Calculated) 561.9 meters 562.13 meters 602.25 meters   601.11 meters      PFT 01/08/2023:   No obstruction, no restriction, mild decrease DLCO  FEV1/FVC 97.2%, TLC 83.8%, DLCO 67.5%     Latest Reference Range & Units 09/08/17 09:40 05/30/19 08:12 11/11/20 09:29 11/24/20 15:13 05/29/21 07:59 02/02/22 11:13 12/05/23 08:36   Eos # 0.0 - 0.5 K/uL 0.1 0.0 0.0 0.0 0.0 0.1 0.0      Latest Reference Range & Units 12/05/23 08:36   IgE 0 - 100 IU/mL 58       ASSESSMENT/PLAN:     1. Asthma in adult, moderate persistent, uncomplicated  -Controlled today.  -Continue Advair diskus inhaler for maintenance therapy. Rinse mouth after each use.   -Continue albuterol inhaler for rescue therapy.   -Continue Singulair daily        2. Obesity with alveolar hypoventilation  -Counseled pt on weight loss & lung function.       Patient Instructions   -Continue Advair diskus inhaler for maintenance therapy. Rinse mouth after each use.   -Continue albuterol inhaler for rescue therapy.   -Continue Singulair daily      Total professional time spent for the encounter: 30 minutes  Time was spent preparing to see the patient, reviewing results of prior testing, obtaining and/or reviewing separately obtained history, performing a medically appropriate examination and interview, counseling and educating the patient/family, ordering medications/tests/procedures, referring and communicating with other health care professionals, documenting clinical information in the electronic health record, and independently interpreting results.    Jose Jones MD

## 2024-08-14 ENCOUNTER — HOSPITAL ENCOUNTER (EMERGENCY)
Facility: HOSPITAL | Age: 34
Discharge: HOME | End: 2024-08-14
Attending: EMERGENCY MEDICINE
Payer: COMMERCIAL

## 2024-08-14 VITALS
WEIGHT: 283 LBS | HEART RATE: 89 BPM | DIASTOLIC BLOOD PRESSURE: 81 MMHG | BODY MASS INDEX: 44.42 KG/M2 | TEMPERATURE: 98 F | OXYGEN SATURATION: 99 % | SYSTOLIC BLOOD PRESSURE: 125 MMHG | RESPIRATION RATE: 18 BRPM | HEIGHT: 67 IN
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
## 2024-08-14 DIAGNOSIS — S29.019A STRAIN OF THORACIC REGION, INITIAL ENCOUNTER: Primary | ICD-10-CM

## 2024-08-14 DIAGNOSIS — S39.012A STRAIN OF LUMBAR REGION, INITIAL ENCOUNTER: ICD-10-CM

## 2024-08-14 LAB
B-HCG UR QL: NEGATIVE
CTP QC/QA: YES

## 2024-08-14 PROCEDURE — 25000003 PHARM REV CODE 250: Mod: ER | Performed by: EMERGENCY MEDICINE

## 2024-08-14 PROCEDURE — 99283 EMERGENCY DEPT VISIT LOW MDM: CPT | Mod: ER

## 2024-08-14 PROCEDURE — 81025 URINE PREGNANCY TEST: CPT | Mod: ER | Performed by: EMERGENCY MEDICINE

## 2024-08-14 RX ORDER — METHOCARBAMOL 750 MG/1
1500 TABLET, FILM COATED ORAL
Status: COMPLETED | OUTPATIENT
Start: 2024-08-14 | End: 2024-08-14

## 2024-08-14 RX ORDER — METHOCARBAMOL 750 MG/1
1500 TABLET, FILM COATED ORAL EVERY 6 HOURS
Qty: 24 TABLET | Refills: 0 | Status: SHIPPED | OUTPATIENT
Start: 2024-08-14 | End: 2024-08-17

## 2024-08-14 RX ADMIN — METHOCARBAMOL 1500 MG: 750 TABLET ORAL at 09:08

## 2024-08-14 NOTE — Clinical Note
"Lilliana Szymanskia" Asher was seen and treated in our emergency department on 8/14/2024.  She may return to work on 08/16/2024.       If you have any questions or concerns, please don't hesitate to call.      TALON Akhtar RN    "

## 2024-08-15 NOTE — DISCHARGE INSTRUCTIONS
May take Tylenol as directed on package as needed for pain.     Drink plenty of electrolyte-rich fluids (at least one gallon or more daily).  Limit/avoid caffeine (such as coffee, tea, Coke, Coke Zero, Pepsi, Root Beer, Dr .Pepper, Mountain Dew, energy drinks, pre-workout supplements, and many others.) and alcohol intake while symptoms persist.
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
## 2024-12-09 ENCOUNTER — TELEPHONE (OUTPATIENT)
Dept: PULMONOLOGY | Facility: CLINIC | Age: 34
End: 2024-12-09
Payer: COMMERCIAL

## 2024-12-09 NOTE — TELEPHONE ENCOUNTER
I called Mrs Sterling to schedule a asthma followup visit with Dr Jones  She will come 2-13-25 at 1 pm. Appointment slip mailed. Wendi Adler

## 2024-12-09 NOTE — TELEPHONE ENCOUNTER
----- Message from Jose Jones MD sent at 1/8/2024 10:35 AM CST -----  Will you please call this patient & offer her a follow up appointment for asthma in January or February? No studies needed.    Thank You,  Dr Jones

## 2025-02-13 ENCOUNTER — OFFICE VISIT (OUTPATIENT)
Dept: PULMONOLOGY | Facility: CLINIC | Age: 35
End: 2025-02-13
Payer: COMMERCIAL

## 2025-02-13 VITALS
BODY MASS INDEX: 35.5 KG/M2 | OXYGEN SATURATION: 100 % | HEART RATE: 67 BPM | DIASTOLIC BLOOD PRESSURE: 70 MMHG | WEIGHT: 226.19 LBS | SYSTOLIC BLOOD PRESSURE: 120 MMHG | HEIGHT: 67 IN

## 2025-02-13 DIAGNOSIS — E66.2 CLASS 2 OBESITY WITH ALVEOLAR HYPOVENTILATION WITHOUT SERIOUS COMORBIDITY WITH BODY MASS INDEX (BMI) OF 35.0 TO 35.9 IN ADULT: ICD-10-CM

## 2025-02-13 DIAGNOSIS — E66.812 CLASS 2 OBESITY WITH ALVEOLAR HYPOVENTILATION WITHOUT SERIOUS COMORBIDITY WITH BODY MASS INDEX (BMI) OF 35.0 TO 35.9 IN ADULT: ICD-10-CM

## 2025-02-13 DIAGNOSIS — J45.40 ASTHMA IN ADULT, MODERATE PERSISTENT, UNCOMPLICATED: Primary | ICD-10-CM

## 2025-02-13 PROCEDURE — 1159F MED LIST DOCD IN RCRD: CPT | Mod: CPTII,S$GLB,, | Performed by: INTERNAL MEDICINE

## 2025-02-13 PROCEDURE — 3008F BODY MASS INDEX DOCD: CPT | Mod: CPTII,S$GLB,, | Performed by: INTERNAL MEDICINE

## 2025-02-13 PROCEDURE — 3078F DIAST BP <80 MM HG: CPT | Mod: CPTII,S$GLB,, | Performed by: INTERNAL MEDICINE

## 2025-02-13 PROCEDURE — 99999 PR PBB SHADOW E&M-EST. PATIENT-LVL III: CPT | Mod: PBBFAC,,, | Performed by: INTERNAL MEDICINE

## 2025-02-13 PROCEDURE — 99214 OFFICE O/P EST MOD 30 MIN: CPT | Mod: S$GLB,,, | Performed by: INTERNAL MEDICINE

## 2025-02-13 PROCEDURE — 3074F SYST BP LT 130 MM HG: CPT | Mod: CPTII,S$GLB,, | Performed by: INTERNAL MEDICINE

## 2025-02-13 NOTE — PROGRESS NOTES
Follow Up  [facility]    SUBJECTIVE:     Chief Complaint:   Asthma    History of Present Illness  She is a 34 year-old female that is here for follow up of asthma. She has a history of childhood & adult asthma. When we first met, she was struggling with symptoms of wheezing and coughing.     We started her on advair inhaler at that time, & it alleviated the symptoms. In addition to this, she has been taking singulair, albuterol MDI prn.    She continues using advair 250/50 inhaler. She scales up & down on the advair as needed based on her symptoms.    She feels like her asthma is well controlled today.    She is from the [place]. She moved to [place] 5 years ago.     Additional Pulmonary History:  Childhood Illnesses:  Asthma   Occupational:  [employer]. .   Environmental: Pollen, smoke, dust  Tobacco/Smoking: Lifetime non-smoker     Review of patient's allergies indicates:   Allergen Reactions    Aspirin Other (See Comments)     GI IRRITATION    Pcn [penicillins] Hives       Past Medical History:   Diagnosis Date    Asthma      Past Surgical History:   Procedure Laterality Date     SECTION      ESOPHAGOGASTRODUODENOSCOPY N/A 2020    Procedure: EGD (ESOPHAGOGASTRODUODENOSCOPY);  Surgeon: Sandor Johnson MD;  Location: [facility];  Service: Endoscopy;  Laterality: N/A;     Family History   Problem Relation Name Age of Onset    Diabetes Paternal Grandmother       Social History     Socioeconomic History    Marital status:    Tobacco Use    Smoking status: Never    Smokeless tobacco: Never   Substance and Sexual Activity    Alcohol use: No    Drug use: No    Sexual activity: Yes     Partners: Male     Birth control/protection: None     Social Drivers of Health     Financial Resource Strain: Low Risk  (2022)    Received from [facility], [facility]    Overall Financial Resource Strain (CARDIA)     Difficulty of Paying Living Expenses: Not  "hard at all   Food Insecurity: No Food Insecurity (9/12/2022)    Received from [facility]    Hunger Vital Sign     Worried About Running Out of Food in the Last Year: Never true     Ran Out of Food in the Last Year: Never true   Transportation Needs: No Transportation Needs (9/12/2022)    Received from [facility]    PRAPARE - Transportation     Lack of Transportation (Medical): No     Lack of Transportation (Non-Medical): No   Physical Activity: Inactive (9/12/2022)    Received from [facility]    Exercise Vital Sign     Days of Exercise per Week: 0 days     Minutes of Exercise per Session: 0 min   Stress: No Stress Concern Present (9/12/2022)    Received from [facility]    [employer] - Occupational Stress Questionnaire     Feeling of Stress : Only a little   Housing Stability: Unknown (9/12/2022)    Received from [facility]    Housing Stability Vital Sign     Unable to Pay for Housing in the Last Year: No     Unstable Housing in the Last Year: No     Review of Systems:  CV: no syncope  ENT: no sore throat  Resp: per hpi  Eyes: no visual changes  Gastrointestinal: no nausea or vomiting  Integument/Breast: no rash  Musculoskeletal: no arthralgias  Neurological: no headaches  Behavioral/Psych: no confusion or depression  Heme: no bleeding    OBJECTIVE:     Vital Signs  Vitals:    02/13/25 1311   BP: 120/70   BP Location: Right arm   Patient Position: Sitting   Pulse: 67   SpO2: 100%   Weight: 102.6 kg (226 lb 3.1 oz)   Height: 5' 7" (1.702 m)     Body mass index is 35.43 kg/m².    Physical Exam  General: no distress  Eyes:  conjunctivae/corneas clear  Nose: no discharge  Neck: trachea midline with no masses appreciated  Lungs:  normal respiratory effort, no wheezes, no rales  Heart: regular rate and rhythm and no murmur  Abdomen: non-distended  Extremities: no cyanosis, no edema, no clubbing  Skin: No rashes or lesions. " "good skin turgor  Neurologic: alert, oriented, thought content appropriate    Laboratory:  Lab Results   Component Value Date    WBC 5.2 07/24/2024    HGB 13.5 07/24/2024    HCT 42.8 07/24/2024    MCV 86 07/24/2024     07/24/2024     Chest Imaging, My Impression:   CXR 12/05/2023: lung fields clear         2/13/2025     1:11 PM 1/31/2025    11:43 AM 10/18/2024    11:02 AM 9/16/2024    11:51 AM 8/28/2024     1:19 PM 8/14/2024     7:28 PM 8/9/2024     1:03 PM   Pulmonary Studies Review   SpO2 100 % 99 % 97 % 98 % 98 % 99 % 94 %   Height 5' 7" (1.702 m) 5' 7" (1.702 m) 5' 7" (1.702 m) 5' 7" (1.702 m) 5' 7" (1.702 m) 5' 7" (1.702 m) 5' 7" (1.702 m)   Weight 102.6 kg (226 lb 3.1 oz) 101.3 kg (223 lb 6.4 oz) 101.1 kg (222 lb 12.8 oz) 105.3 kg (232 lb 3.2 oz) 107 kg (235 lb 12.8 oz) 128.4 kg (283 lb) 107.7 kg (237 lb 6.4 oz)   BMI (Calculated) 35.4 35 34.9 36.4 36.9 44.3 37.2   Predicted Distance 453.05 455.55 462 452.64 449.52 403.35 447.65   Predicted Distance Meters (Calculated) 588.87 meters 591.77 meters 598.17 meters 588.41 meters 584.67 meters 535.64 meters 583.01 meters     PFT 01/08/2023:   No obstruction, no restriction, mild decrease DLCO  FEV1/FVC 97.2%, TLC 83.8%, DLCO 67.5%     Latest Reference Range & Units 09/08/17 09:40 05/30/19 08:12 11/11/20 09:29 11/24/20 15:13 05/29/21 07:59 02/02/22 11:13 12/05/23 08:36   Eos # 0.0 - 0.5 K/uL 0.1 0.0 0.0 0.0 0.0 0.1 0.0      Latest Reference Range & Units 12/05/23 08:36   IgE 0 - 100 IU/mL 58       ASSESSMENT/PLAN:     1. Asthma in adult, moderate persistent, uncomplicated  -Controlled today.  -Continue Advair diskus inhaler for maintenance therapy. Rinse mouth after each use. Scale up & down on dosing based on symptoms; okay to use zero puffs per day up to one puff twice per.  -Continue albuterol inhaler for rescue therapy.   -Continue Singulair daily      2. Obesity with alveolar hypoventilation  -Counseled pt on weight loss & lung function. Pt has lost 15 " bulmaronds since her last visit. Great work!      Jose Jones MD